# Patient Record
Sex: MALE | Race: WHITE | NOT HISPANIC OR LATINO | Employment: OTHER | URBAN - METROPOLITAN AREA
[De-identification: names, ages, dates, MRNs, and addresses within clinical notes are randomized per-mention and may not be internally consistent; named-entity substitution may affect disease eponyms.]

---

## 2017-03-25 ENCOUNTER — OFFICE VISIT (OUTPATIENT)
Dept: URGENT CARE | Facility: CLINIC | Age: 57
End: 2017-03-25
Payer: OTHER MISCELLANEOUS

## 2017-03-25 VITALS
HEART RATE: 72 BPM | RESPIRATION RATE: 18 BRPM | OXYGEN SATURATION: 97 % | TEMPERATURE: 98.4 F | DIASTOLIC BLOOD PRESSURE: 68 MMHG | SYSTOLIC BLOOD PRESSURE: 104 MMHG
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
## 2017-03-25 DIAGNOSIS — M54.50 ACUTE LEFT-SIDED LOW BACK PAIN WITHOUT SCIATICA: ICD-10-CM

## 2017-03-25 LAB
APPEARANCE UR: CLEAR
BILIRUB UR STRIP-MCNC: NORMAL MG/DL
COLOR UR AUTO: NORMAL
GLUCOSE UR STRIP.AUTO-MCNC: NORMAL MG/DL
KETONES UR STRIP.AUTO-MCNC: NORMAL MG/DL
LEUKOCYTE ESTERASE UR QL STRIP.AUTO: NORMAL
NITRITE UR QL STRIP.AUTO: NORMAL
PH UR STRIP.AUTO: 5 [PH] (ref 5–8)
PROT UR QL STRIP: NORMAL MG/DL
RBC UR QL AUTO: NORMAL
SP GR UR STRIP.AUTO: 1.01
UROBILINOGEN UR STRIP-MCNC: NORMAL MG/DL

## 2017-03-25 PROCEDURE — 99213 OFFICE O/P EST LOW 20 MIN: CPT | Performed by: PHYSICIAN ASSISTANT

## 2017-03-25 PROCEDURE — 81002 URINALYSIS NONAUTO W/O SCOPE: CPT | Performed by: PHYSICIAN ASSISTANT

## 2017-03-25 ASSESSMENT — ENCOUNTER SYMPTOMS
CHILLS: 0
DOUBLE VISION: 0
VOMITING: 0
LEG PAIN: 0
BACK PAIN: 1
FLANK PAIN: 0
ABDOMINAL PAIN: 0
DIARRHEA: 0
BOWEL INCONTINENCE: 0
FALLS: 0
COUGH: 0
BLURRED VISION: 0
SENSORY CHANGE: 0
PERIANAL NUMBNESS: 0
FEVER: 0
NAUSEA: 0
NUMBNESS: 0
HEADACHES: 0
WHEEZING: 0
PALPITATIONS: 0
TINGLING: 0
LOSS OF CONSCIOUSNESS: 0
SHORTNESS OF BREATH: 0
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
## 2017-10-27 ENCOUNTER — APPOINTMENT (OUTPATIENT)
Dept: RADIOLOGY | Facility: IMAGING CENTER | Age: 57
End: 2017-10-27
Attending: FAMILY MEDICINE
Payer: OTHER MISCELLANEOUS

## 2017-10-27 ENCOUNTER — OFFICE VISIT (OUTPATIENT)
Dept: URGENT CARE | Facility: CLINIC | Age: 57
End: 2017-10-27
Payer: OTHER MISCELLANEOUS

## 2017-10-27 VITALS
SYSTOLIC BLOOD PRESSURE: 120 MMHG | WEIGHT: 160 LBS | DIASTOLIC BLOOD PRESSURE: 80 MMHG | RESPIRATION RATE: 20 BRPM | HEIGHT: 73 IN | HEART RATE: 60 BPM | TEMPERATURE: 97.8 F | OXYGEN SATURATION: 99 % | BODY MASS INDEX: 21.2 KG/M2

## 2017-10-27 DIAGNOSIS — R13.19 ESOPHAGEAL DYSPHAGIA: ICD-10-CM

## 2017-10-27 PROCEDURE — 99214 OFFICE O/P EST MOD 30 MIN: CPT | Performed by: FAMILY MEDICINE

## 2017-10-27 PROCEDURE — 71020 DX-CHEST-2 VIEWS: CPT | Mod: TC | Performed by: FAMILY MEDICINE

## 2017-10-27 RX ORDER — HYOSCYAMINE SULFATE 0.125 MG
125 TABLET ORAL EVERY 6 HOURS PRN
Qty: 20 TAB | Refills: 0 | Status: SHIPPED | OUTPATIENT
Start: 2017-10-27 | End: 2019-09-08

## 2017-10-27 ASSESSMENT — ENCOUNTER SYMPTOMS
BLOOD IN STOOL: 0
SHORTNESS OF BREATH: 0
ABDOMINAL PAIN: 0
FEVER: 0
HEMOPTYSIS: 0
FOCAL WEAKNESS: 0
NAUSEA: 0
CHILLS: 0
ORTHOPNEA: 0
DIZZINESS: 0

## 2017-10-27 NOTE — PROGRESS NOTES
"Subjective:      Marcello Kendrick is a 57 y.o. male who presents with Pharyngitis (Couple days a lot mucus)    Chief Complaint   Patient presents with   • Pharyngitis     Couple days a lot mucus        - This is a very pleasant 57 y.o. male with complaints of trouble swallowing solids x 1 day. Sometimes comes back up. Liquids OK. No NVFC, CP/SOB/Abd pain, bloody stools.            ALLERGIES:  Review of patient's allergies indicates no known allergies.     PMH:  No past medical history on file.     MEDS:    Current Outpatient Prescriptions:   •  hyoscyamine (ANASPAZ, LEVSIN) 0.125 MG tablet, Take 1 Tab by mouth every 6 hours as needed for Cramping., Disp: 20 Tab, Rfl: 0  •  tamsulosin (FLOMAX) 0.4 MG capsule, Take 0.4 mg by mouth ONE-HALF HOUR AFTER BREAKFAST., Disp: , Rfl:   •  finasteride (PROSCAR) 5 MG TABS, Take 5 mg by mouth every day., Disp: , Rfl:   •  oxycodone-acetaminophen (PERCOCET) 5-325 MG TABS, Take 1 Tab by mouth every 8 hours as needed for Moderate Pain., Disp: 15 Tab, Rfl: 0  •  ondansetron (ZOFRAN) 8 MG TABS, Take 1 Tab by mouth every 8 hours as needed for Nausea/Vomiting., Disp: 15 Tab, Rfl: 0    ** I have documented what I find to be significant in regards to past medical, social, family and surgical history  in my HPI or under PMH/PSH/FH review section, otherwise it is contributory **           HPI    Review of Systems   Constitutional: Negative for chills and fever.   Respiratory: Negative for hemoptysis and shortness of breath.    Cardiovascular: Negative for chest pain and orthopnea.   Gastrointestinal: Negative for abdominal pain, blood in stool and nausea.   Neurological: Negative for dizziness and focal weakness.          Objective:     /80   Pulse 60   Temp 36.6 °C (97.8 °F)   Resp 20   Ht 1.854 m (6' 1\")   Wt 72.6 kg (160 lb)   SpO2 99%   BMI 21.11 kg/m²      Physical Exam   Constitutional: He appears well-developed. No distress.   HENT:   Head: Normocephalic and atraumatic. "   Mouth/Throat: Oropharynx is clear and moist.   Eyes: Conjunctivae are normal.   Neck: Neck supple.   Cardiovascular: Regular rhythm.    No murmur heard.  Pulmonary/Chest: Effort normal and breath sounds normal. No respiratory distress.   Abdominal: Soft. There is no tenderness.   Neurological: He is alert. He exhibits normal muscle tone.   Skin: Skin is warm and dry.   Psychiatric: He has a normal mood and affect. Judgment normal.   Nursing note and vitals reviewed.              Assessment/Plan:         1. Esophageal dysphagia  DX-CHEST-2 VIEWS    REFERRAL TO FAMILY PRACTICE    REFERRAL TO GASTROENTEROLOGY    hyoscyamine (ANASPAZ, LEVSIN) 0.125 MG tablet     - soft diet  - f/u PCP/GI next week  - worse over weekend go to ER        Dx & d/c instructions discussed w/ patient and/or family members. Follow up w/ Prvt Dr or here in 3-4 days, sooner if needed,  ER if worse and UC/PCP unavailable.        Possible side effects (i.e. Rash, GI upset/constipation, sedation, elevation of BP or sugars) of any medications given discussed.

## 2019-09-08 ENCOUNTER — APPOINTMENT (OUTPATIENT)
Dept: RADIOLOGY | Facility: MEDICAL CENTER | Age: 59
DRG: 470 | End: 2019-09-08
Attending: EMERGENCY MEDICINE
Payer: OTHER MISCELLANEOUS

## 2019-09-08 ENCOUNTER — HOSPITAL ENCOUNTER (INPATIENT)
Facility: MEDICAL CENTER | Age: 59
LOS: 3 days | DRG: 470 | End: 2019-09-11
Attending: EMERGENCY MEDICINE | Admitting: HOSPITALIST
Payer: OTHER MISCELLANEOUS

## 2019-09-08 ENCOUNTER — OFFICE VISIT (OUTPATIENT)
Dept: URGENT CARE | Facility: CLINIC | Age: 59
End: 2019-09-08
Payer: OTHER MISCELLANEOUS

## 2019-09-08 ENCOUNTER — APPOINTMENT (OUTPATIENT)
Dept: RADIOLOGY | Facility: IMAGING CENTER | Age: 59
End: 2019-09-08
Attending: PHYSICIAN ASSISTANT
Payer: OTHER MISCELLANEOUS

## 2019-09-08 VITALS
WEIGHT: 160 LBS | HEIGHT: 73 IN | HEART RATE: 98 BPM | TEMPERATURE: 98.7 F | BODY MASS INDEX: 21.2 KG/M2 | DIASTOLIC BLOOD PRESSURE: 80 MMHG | OXYGEN SATURATION: 99 % | RESPIRATION RATE: 16 BRPM | SYSTOLIC BLOOD PRESSURE: 110 MMHG

## 2019-09-08 DIAGNOSIS — M25.552 LEFT HIP PAIN: ICD-10-CM

## 2019-09-08 DIAGNOSIS — S72.002A CLOSED FRACTURE OF LEFT HIP, INITIAL ENCOUNTER (HCC): ICD-10-CM

## 2019-09-08 PROBLEM — R73.9 HYPERGLYCEMIA: Status: ACTIVE | Noted: 2019-09-08

## 2019-09-08 PROBLEM — D75.89 MACROCYTOSIS: Status: ACTIVE | Noted: 2019-09-08

## 2019-09-08 PROBLEM — N40.0 BPH (BENIGN PROSTATIC HYPERPLASIA): Status: ACTIVE | Noted: 2019-09-08

## 2019-09-08 LAB
ALBUMIN SERPL BCP-MCNC: 3.8 G/DL (ref 3.2–4.9)
ALBUMIN/GLOB SERPL: 1.5 G/DL
ALP SERPL-CCNC: 49 U/L (ref 30–99)
ALT SERPL-CCNC: 13 U/L (ref 2–50)
ANION GAP SERPL CALC-SCNC: 9 MMOL/L (ref 0–11.9)
APTT PPP: 28.1 SEC (ref 24.7–36)
AST SERPL-CCNC: 19 U/L (ref 12–45)
BASOPHILS # BLD AUTO: 0.6 % (ref 0–1.8)
BASOPHILS # BLD: 0.03 K/UL (ref 0–0.12)
BILIRUB SERPL-MCNC: 1.5 MG/DL (ref 0.1–1.5)
BUN SERPL-MCNC: 26 MG/DL (ref 8–22)
CALCIUM SERPL-MCNC: 9.4 MG/DL (ref 8.5–10.5)
CHLORIDE SERPL-SCNC: 105 MMOL/L (ref 96–112)
CO2 SERPL-SCNC: 24 MMOL/L (ref 20–33)
CREAT SERPL-MCNC: 0.87 MG/DL (ref 0.5–1.4)
EOSINOPHIL # BLD AUTO: 0.12 K/UL (ref 0–0.51)
EOSINOPHIL NFR BLD: 2.3 % (ref 0–6.9)
ERYTHROCYTE [DISTWIDTH] IN BLOOD BY AUTOMATED COUNT: 48.4 FL (ref 35.9–50)
GLOBULIN SER CALC-MCNC: 2.5 G/DL (ref 1.9–3.5)
GLUCOSE SERPL-MCNC: 101 MG/DL (ref 65–99)
HCT VFR BLD AUTO: 43.6 % (ref 42–52)
HGB BLD-MCNC: 14.1 G/DL (ref 14–18)
IMM GRANULOCYTES # BLD AUTO: 0.01 K/UL (ref 0–0.11)
IMM GRANULOCYTES NFR BLD AUTO: 0.2 % (ref 0–0.9)
INR PPP: 1.09 (ref 0.87–1.13)
LYMPHOCYTES # BLD AUTO: 0.83 K/UL (ref 1–4.8)
LYMPHOCYTES NFR BLD: 16 % (ref 22–41)
MCH RBC QN AUTO: 33.1 PG (ref 27–33)
MCHC RBC AUTO-ENTMCNC: 32.3 G/DL (ref 33.7–35.3)
MCV RBC AUTO: 102.3 FL (ref 81.4–97.8)
MONOCYTES # BLD AUTO: 0.51 K/UL (ref 0–0.85)
MONOCYTES NFR BLD AUTO: 9.8 % (ref 0–13.4)
NEUTROPHILS # BLD AUTO: 3.69 K/UL (ref 1.82–7.42)
NEUTROPHILS NFR BLD: 71.1 % (ref 44–72)
NRBC # BLD AUTO: 0 K/UL
NRBC BLD-RTO: 0 /100 WBC
PLATELET # BLD AUTO: 111 K/UL (ref 164–446)
PMV BLD AUTO: 12.8 FL (ref 9–12.9)
POTASSIUM SERPL-SCNC: 3.9 MMOL/L (ref 3.6–5.5)
PROT SERPL-MCNC: 6.3 G/DL (ref 6–8.2)
PROTHROMBIN TIME: 14.3 SEC (ref 12–14.6)
RBC # BLD AUTO: 4.26 M/UL (ref 4.7–6.1)
SODIUM SERPL-SCNC: 138 MMOL/L (ref 135–145)
WBC # BLD AUTO: 5.2 K/UL (ref 4.8–10.8)

## 2019-09-08 PROCEDURE — 73501 X-RAY EXAM HIP UNI 1 VIEW: CPT | Mod: TC,LT | Performed by: PHYSICIAN ASSISTANT

## 2019-09-08 PROCEDURE — 85730 THROMBOPLASTIN TIME PARTIAL: CPT

## 2019-09-08 PROCEDURE — 700105 HCHG RX REV CODE 258: Performed by: HOSPITALIST

## 2019-09-08 PROCEDURE — 99214 OFFICE O/P EST MOD 30 MIN: CPT | Performed by: PHYSICIAN ASSISTANT

## 2019-09-08 PROCEDURE — 85025 COMPLETE CBC W/AUTO DIFF WBC: CPT

## 2019-09-08 PROCEDURE — 93005 ELECTROCARDIOGRAM TRACING: CPT | Performed by: EMERGENCY MEDICINE

## 2019-09-08 PROCEDURE — 80053 COMPREHEN METABOLIC PANEL: CPT

## 2019-09-08 PROCEDURE — 99223 1ST HOSP IP/OBS HIGH 75: CPT | Performed by: HOSPITALIST

## 2019-09-08 PROCEDURE — 85610 PROTHROMBIN TIME: CPT

## 2019-09-08 PROCEDURE — 770006 HCHG ROOM/CARE - MED/SURG/GYN SEMI*

## 2019-09-08 PROCEDURE — 71045 X-RAY EXAM CHEST 1 VIEW: CPT

## 2019-09-08 PROCEDURE — 73552 X-RAY EXAM OF FEMUR 2/>: CPT | Mod: LT

## 2019-09-08 RX ORDER — DOXAZOSIN MESYLATE 1 MG/1
1 TABLET ORAL
COMMUNITY

## 2019-09-08 RX ORDER — PROCHLORPERAZINE EDISYLATE 5 MG/ML
5-10 INJECTION INTRAMUSCULAR; INTRAVENOUS EVERY 4 HOURS PRN
Status: DISCONTINUED | OUTPATIENT
Start: 2019-09-08 | End: 2019-09-11 | Stop reason: HOSPADM

## 2019-09-08 RX ORDER — ONDANSETRON 2 MG/ML
4 INJECTION INTRAMUSCULAR; INTRAVENOUS EVERY 4 HOURS PRN
Status: DISCONTINUED | OUTPATIENT
Start: 2019-09-08 | End: 2019-09-11 | Stop reason: HOSPADM

## 2019-09-08 RX ORDER — BISACODYL 10 MG
10 SUPPOSITORY, RECTAL RECTAL
Status: DISCONTINUED | OUTPATIENT
Start: 2019-09-08 | End: 2019-09-11 | Stop reason: HOSPADM

## 2019-09-08 RX ORDER — POLYETHYLENE GLYCOL 3350 17 G/17G
1 POWDER, FOR SOLUTION ORAL
Status: DISCONTINUED | OUTPATIENT
Start: 2019-09-08 | End: 2019-09-11 | Stop reason: HOSPADM

## 2019-09-08 RX ORDER — ONDANSETRON 4 MG/1
4 TABLET, ORALLY DISINTEGRATING ORAL EVERY 4 HOURS PRN
Status: DISCONTINUED | OUTPATIENT
Start: 2019-09-08 | End: 2019-09-11 | Stop reason: HOSPADM

## 2019-09-08 RX ORDER — PROMETHAZINE HYDROCHLORIDE 12.5 MG/1
12.5-25 SUPPOSITORY RECTAL EVERY 4 HOURS PRN
Status: DISCONTINUED | OUTPATIENT
Start: 2019-09-08 | End: 2019-09-11 | Stop reason: HOSPADM

## 2019-09-08 RX ORDER — PROMETHAZINE HYDROCHLORIDE 25 MG/1
12.5-25 TABLET ORAL EVERY 4 HOURS PRN
Status: DISCONTINUED | OUTPATIENT
Start: 2019-09-08 | End: 2019-09-11 | Stop reason: HOSPADM

## 2019-09-08 RX ORDER — SODIUM CHLORIDE 9 MG/ML
INJECTION, SOLUTION INTRAVENOUS CONTINUOUS
Status: DISCONTINUED | OUTPATIENT
Start: 2019-09-08 | End: 2019-09-10

## 2019-09-08 RX ORDER — AMOXICILLIN 250 MG
2 CAPSULE ORAL 2 TIMES DAILY
Status: DISCONTINUED | OUTPATIENT
Start: 2019-09-09 | End: 2019-09-11 | Stop reason: HOSPADM

## 2019-09-08 RX ORDER — DOXAZOSIN 2 MG/1
1 TABLET ORAL
Status: DISCONTINUED | OUTPATIENT
Start: 2019-09-09 | End: 2019-09-11 | Stop reason: HOSPADM

## 2019-09-08 RX ADMIN — SODIUM CHLORIDE: 9 INJECTION, SOLUTION INTRAVENOUS at 23:52

## 2019-09-08 ASSESSMENT — PATIENT HEALTH QUESTIONNAIRE - PHQ9
1. LITTLE INTEREST OR PLEASURE IN DOING THINGS: NOT AT ALL
SUM OF ALL RESPONSES TO PHQ9 QUESTIONS 1 AND 2: 0
2. FEELING DOWN, DEPRESSED, IRRITABLE, OR HOPELESS: NOT AT ALL

## 2019-09-08 ASSESSMENT — LIFESTYLE VARIABLES
CONSUMPTION TOTAL: NEGATIVE
TOTAL SCORE: 0
EVER FELT BAD OR GUILTY ABOUT YOUR DRINKING: NO
ON A TYPICAL DAY WHEN YOU DRINK ALCOHOL HOW MANY DRINKS DO YOU HAVE: 1
AVERAGE NUMBER OF DAYS PER WEEK YOU HAVE A DRINK CONTAINING ALCOHOL: 0
HAVE YOU EVER FELT YOU SHOULD CUT DOWN ON YOUR DRINKING: NO
TOTAL SCORE: 0
EVER HAD A DRINK FIRST THING IN THE MORNING TO STEADY YOUR NERVES TO GET RID OF A HANGOVER: NO
HOW MANY TIMES IN THE PAST YEAR HAVE YOU HAD 5 OR MORE DRINKS IN A DAY: 0
ALCOHOL_USE: YES
TOTAL SCORE: 0
HAVE PEOPLE ANNOYED YOU BY CRITICIZING YOUR DRINKING: NO

## 2019-09-08 ASSESSMENT — COGNITIVE AND FUNCTIONAL STATUS - GENERAL
CLIMB 3 TO 5 STEPS WITH RAILING: A LITTLE
MOBILITY SCORE: 20
WALKING IN HOSPITAL ROOM: A LITTLE
TOILETING: A LITTLE
DAILY ACTIVITIY SCORE: 21
DRESSING REGULAR LOWER BODY CLOTHING: A LITTLE
SUGGESTED CMS G CODE MODIFIER DAILY ACTIVITY: CJ
HELP NEEDED FOR BATHING: A LITTLE
MOVING FROM LYING ON BACK TO SITTING ON SIDE OF FLAT BED: A LITTLE
STANDING UP FROM CHAIR USING ARMS: A LITTLE
SUGGESTED CMS G CODE MODIFIER MOBILITY: CJ

## 2019-09-08 NOTE — ED PROVIDER NOTES
ED Provider Note    CHIEF COMPLAINT  Chief Complaint   Patient presents with   • Hip Injury     fell walking down hill on Thursday, landed on rock   • Sent from Urgent Care     found to have femoral neck fx with impaction       HPI  Marcello Kendrick is a 58 y.o. male who presents to the emergency department complaining of left hip pain.  On Thursday now 3 days ago the patient was hiking down a hill he slipped and fell landing on a rock striking his left hip and since that time is had a lot of pain in the area of the left hip.  He has been trying to bear through the pain and has been using crutches which he had at home he is been taking Advil without much relief and because of continued symptoms today went to an urgent care where he had an x-ray showing a femoral neck fracture and the patient was directed here for further care.  His last oral intake was 9:30 AM this morning.  He feels that he is otherwise uninjured    REVIEW OF SYSTEMS he did not strike his head, no neck or spine injury no other extremity injury or injury to the torso.  All other systems negative    PAST MEDICAL HISTORY  History reviewed. No pertinent past medical history.    FAMILY HISTORY  History reviewed. No pertinent family history.    SOCIAL HISTORY  Social History     Socioeconomic History   • Marital status:      Spouse name: Not on file   • Number of children: Not on file   • Years of education: Not on file   • Highest education level: Not on file   Occupational History   • Not on file   Social Needs   • Financial resource strain: Not on file   • Food insecurity:     Worry: Not on file     Inability: Not on file   • Transportation needs:     Medical: Not on file     Non-medical: Not on file   Tobacco Use   • Smoking status: Never Smoker   • Smokeless tobacco: Never Used   Substance and Sexual Activity   • Alcohol use: Yes     Comment: occ   • Drug use: Never   • Sexual activity: Not on file   Lifestyle   • Physical activity:     Days  "per week: Not on file     Minutes per session: Not on file   • Stress: Not on file   Relationships   • Social connections:     Talks on phone: Not on file     Gets together: Not on file     Attends Mandaen service: Not on file     Active member of club or organization: Not on file     Attends meetings of clubs or organizations: Not on file     Relationship status: Not on file   • Intimate partner violence:     Fear of current or ex partner: Not on file     Emotionally abused: Not on file     Physically abused: Not on file     Forced sexual activity: Not on file   Other Topics Concern   • Not on file   Social History Narrative   • Not on file       SURGICAL HISTORY  Past Surgical History:   Procedure Laterality Date   • RETINAL DETACHMENT REPAIR         CURRENT MEDICATIONS  Home Medications     Reviewed by Darien Smith (Pharmacy Tech) on 09/08/19 at 1452  Med List Status: Complete   Medication Last Dose Status   doxazosin (CARDURA) 1 MG Tab 9/7/2019 Active                ALLERGIES  No Known Allergies    PHYSICAL EXAM  VITAL SIGNS: /75   Pulse 84   Temp 37 °C (98.6 °F) (Temporal)   Resp 16   Ht 1.854 m (6' 1\")   Wt 72.6 kg (160 lb)   SpO2 98%   BMI 21.11 kg/m²    Oxygen saturation is interpreted as adequate  Constitutional: Awake and well appearing individual  HENT: No sign of trauma to the head  Eyes: Pupils round extraocular motion present  Neck: C-spine nontender  Cardiovascular: Regular rate and rhythm  Lungs: Clear and equal bilaterally  Abdomen/Back: No thoracic or lumbar tenderness  Skin: Dry  Musculoskeletal: No acute bony deformity although the patient has pain around the left hip.  The distal part of the leg looks normal and warm and well-perfused   neurologic: Awake verbal moving his other extremities without difficulty    CHART REVIEW  I reviewed the x-ray results from the urgent care that showed an impacted left femoral neck fracture    Laboratory  CBC shows white blood cell count of " 5.2 hemoglobin is adequate at 14.1 complete metabolic panel is unremarkable INR is 1.09    EKG interpretation  Twelve-lead preop EKG shows sinus rhythm 62 bpm there is no pathologic ST elevation or ectopy the VT interval is 196 ms QTc interval is 419 ms    Radiology  DX-CHEST-LIMITED (1 VIEW)   Final Result      No evidence of acute cardiopulmonary process.      DX-FEMUR-2+ LEFT   Final Result      Impacted and angulated left femoral neck fracture.        MEDICAL DECISION MAKING and DISPOSITION  In the emergency department an IV was established the patient was initially kept n.p.o.  I reviewed all the findings with the patient and his wife and I reviewed the case with Dr. Mckeon who is come to the emergency department and has seen the patient and the patient will be admitted for hip surgery tomorrow by Dr. Boyce.  I reviewed the case with the hospitalist and the patient is admitted to the hospitalist service    IMPRESSION  1.  Left femoral neck fracture      Electronically signed by: Jose L Ortega, 9/8/2019 4:38 PM

## 2019-09-08 NOTE — ED TRIAGE NOTES
Marcello Kendrick  58 y.o.  Chief Complaint   Patient presents with   • Hip Injury     fell walking down hill on Thursday, landed on rock   • Sent from Urgent Care     found to have femoral neck fx with impaction     Patient wheeled in wc with wife to triage room with above complaint.  Patient appears in mild discomfort.  States pain is mostly there when he tries to bare weight.    Charge notified of patient.  Patient escorted to room 66.

## 2019-09-09 ENCOUNTER — ANESTHESIA (OUTPATIENT)
Dept: SURGERY | Facility: MEDICAL CENTER | Age: 59
DRG: 470 | End: 2019-09-09
Payer: OTHER MISCELLANEOUS

## 2019-09-09 ENCOUNTER — APPOINTMENT (OUTPATIENT)
Dept: RADIOLOGY | Facility: MEDICAL CENTER | Age: 59
DRG: 470 | End: 2019-09-09
Attending: ORTHOPAEDIC SURGERY
Payer: OTHER MISCELLANEOUS

## 2019-09-09 PROBLEM — D69.6 THROMBOCYTOPENIA (HCC): Status: ACTIVE | Noted: 2019-09-09

## 2019-09-09 LAB
ABO + RH BLD: NORMAL
ABO GROUP BLD: NORMAL
ANION GAP SERPL CALC-SCNC: 9 MMOL/L (ref 0–11.9)
BASOPHILS # BLD AUTO: 0.5 % (ref 0–1.8)
BASOPHILS # BLD: 0.03 K/UL (ref 0–0.12)
BLD GP AB SCN SERPL QL: NORMAL
BUN SERPL-MCNC: 24 MG/DL (ref 8–22)
CALCIUM SERPL-MCNC: 9.2 MG/DL (ref 8.5–10.5)
CHLORIDE SERPL-SCNC: 105 MMOL/L (ref 96–112)
CO2 SERPL-SCNC: 24 MMOL/L (ref 20–33)
CREAT SERPL-MCNC: 0.8 MG/DL (ref 0.5–1.4)
EOSINOPHIL # BLD AUTO: 0.16 K/UL (ref 0–0.51)
EOSINOPHIL NFR BLD: 2.8 % (ref 0–6.9)
ERYTHROCYTE [DISTWIDTH] IN BLOOD BY AUTOMATED COUNT: 47.8 FL (ref 35.9–50)
FOLATE SERPL-MCNC: 12.6 NG/ML
GLUCOSE SERPL-MCNC: 97 MG/DL (ref 65–99)
HCT VFR BLD AUTO: 33.1 % (ref 42–52)
HCT VFR BLD AUTO: 43.7 % (ref 42–52)
HGB BLD-MCNC: 10.9 G/DL (ref 14–18)
HGB BLD-MCNC: 14.6 G/DL (ref 14–18)
IMM GRANULOCYTES # BLD AUTO: 0.02 K/UL (ref 0–0.11)
IMM GRANULOCYTES NFR BLD AUTO: 0.4 % (ref 0–0.9)
LYMPHOCYTES # BLD AUTO: 1.21 K/UL (ref 1–4.8)
LYMPHOCYTES NFR BLD: 21.4 % (ref 22–41)
MAGNESIUM SERPL-MCNC: 1.8 MG/DL (ref 1.5–2.5)
MCH RBC QN AUTO: 33.9 PG (ref 27–33)
MCHC RBC AUTO-ENTMCNC: 33.4 G/DL (ref 33.7–35.3)
MCV RBC AUTO: 101.4 FL (ref 81.4–97.8)
MONOCYTES # BLD AUTO: 0.52 K/UL (ref 0–0.85)
MONOCYTES NFR BLD AUTO: 9.2 % (ref 0–13.4)
NEUTROPHILS # BLD AUTO: 3.72 K/UL (ref 1.82–7.42)
NEUTROPHILS NFR BLD: 65.7 % (ref 44–72)
NRBC # BLD AUTO: 0 K/UL
NRBC BLD-RTO: 0 /100 WBC
PLATELET # BLD AUTO: 117 K/UL (ref 164–446)
PMV BLD AUTO: 12.5 FL (ref 9–12.9)
POTASSIUM SERPL-SCNC: 3.6 MMOL/L (ref 3.6–5.5)
RBC # BLD AUTO: 4.31 M/UL (ref 4.7–6.1)
RH BLD: NORMAL
SODIUM SERPL-SCNC: 138 MMOL/L (ref 135–145)
VIT B12 SERPL-MCNC: 639 PG/ML (ref 211–911)
WBC # BLD AUTO: 5.7 K/UL (ref 4.8–10.8)

## 2019-09-09 PROCEDURE — 700111 HCHG RX REV CODE 636 W/ 250 OVERRIDE (IP): Performed by: ORTHOPAEDIC SURGERY

## 2019-09-09 PROCEDURE — 82746 ASSAY OF FOLIC ACID SERUM: CPT

## 2019-09-09 PROCEDURE — 700111 HCHG RX REV CODE 636 W/ 250 OVERRIDE (IP): Performed by: ANESTHESIOLOGY

## 2019-09-09 PROCEDURE — 86901 BLOOD TYPING SEROLOGIC RH(D): CPT

## 2019-09-09 PROCEDURE — 99233 SBSQ HOSP IP/OBS HIGH 50: CPT | Performed by: FAMILY MEDICINE

## 2019-09-09 PROCEDURE — 72170 X-RAY EXAM OF PELVIS: CPT

## 2019-09-09 PROCEDURE — 80048 BASIC METABOLIC PNL TOTAL CA: CPT

## 2019-09-09 PROCEDURE — 0SRB04A REPLACEMENT OF LEFT HIP JOINT WITH CERAMIC ON POLYETHYLENE SYNTHETIC SUBSTITUTE, UNCEMENTED, OPEN APPROACH: ICD-10-PCS | Performed by: ORTHOPAEDIC SURGERY

## 2019-09-09 PROCEDURE — 160031 HCHG SURGERY MINUTES - 1ST 30 MINS LEVEL 5: Performed by: ORTHOPAEDIC SURGERY

## 2019-09-09 PROCEDURE — 86900 BLOOD TYPING SEROLOGIC ABO: CPT

## 2019-09-09 PROCEDURE — 501838 HCHG SUTURE GENERAL: Performed by: ORTHOPAEDIC SURGERY

## 2019-09-09 PROCEDURE — 83735 ASSAY OF MAGNESIUM: CPT

## 2019-09-09 PROCEDURE — 86850 RBC ANTIBODY SCREEN: CPT

## 2019-09-09 PROCEDURE — A9270 NON-COVERED ITEM OR SERVICE: HCPCS | Performed by: HOSPITALIST

## 2019-09-09 PROCEDURE — 160035 HCHG PACU - 1ST 60 MINS PHASE I: Performed by: ORTHOPAEDIC SURGERY

## 2019-09-09 PROCEDURE — 85018 HEMOGLOBIN: CPT

## 2019-09-09 PROCEDURE — A9270 NON-COVERED ITEM OR SERVICE: HCPCS | Performed by: ANESTHESIOLOGY

## 2019-09-09 PROCEDURE — 501445 HCHG STAPLER, SKIN DISP: Performed by: ORTHOPAEDIC SURGERY

## 2019-09-09 PROCEDURE — 160002 HCHG RECOVERY MINUTES (STAT): Performed by: ORTHOPAEDIC SURGERY

## 2019-09-09 PROCEDURE — 0QS704Z REPOSITION LEFT UPPER FEMUR WITH INTERNAL FIXATION DEVICE, OPEN APPROACH: ICD-10-PCS | Performed by: ORTHOPAEDIC SURGERY

## 2019-09-09 PROCEDURE — 502000 HCHG MISC OR IMPLANTS RC 0278: Performed by: ORTHOPAEDIC SURGERY

## 2019-09-09 PROCEDURE — 85025 COMPLETE CBC W/AUTO DIFF WBC: CPT

## 2019-09-09 PROCEDURE — 700102 HCHG RX REV CODE 250 W/ 637 OVERRIDE(OP): Performed by: ANESTHESIOLOGY

## 2019-09-09 PROCEDURE — 82607 VITAMIN B-12: CPT

## 2019-09-09 PROCEDURE — 700105 HCHG RX REV CODE 258: Performed by: ANESTHESIOLOGY

## 2019-09-09 PROCEDURE — 160042 HCHG SURGERY MINUTES - EA ADDL 1 MIN LEVEL 5: Performed by: ORTHOPAEDIC SURGERY

## 2019-09-09 PROCEDURE — 160036 HCHG PACU - EA ADDL 30 MINS PHASE I: Performed by: ORTHOPAEDIC SURGERY

## 2019-09-09 PROCEDURE — 160048 HCHG OR STATISTICAL LEVEL 1-5: Performed by: ORTHOPAEDIC SURGERY

## 2019-09-09 PROCEDURE — 502578 HCHG PACK, TOTAL HIP: Performed by: ORTHOPAEDIC SURGERY

## 2019-09-09 PROCEDURE — 700105 HCHG RX REV CODE 258: Performed by: ORTHOPAEDIC SURGERY

## 2019-09-09 PROCEDURE — 770001 HCHG ROOM/CARE - MED/SURG/GYN PRIV*

## 2019-09-09 PROCEDURE — 36415 COLL VENOUS BLD VENIPUNCTURE: CPT

## 2019-09-09 PROCEDURE — 85014 HEMATOCRIT: CPT

## 2019-09-09 PROCEDURE — 700101 HCHG RX REV CODE 250: Performed by: ORTHOPAEDIC SURGERY

## 2019-09-09 PROCEDURE — 160009 HCHG ANES TIME/MIN: Performed by: ORTHOPAEDIC SURGERY

## 2019-09-09 PROCEDURE — 700101 HCHG RX REV CODE 250: Performed by: ANESTHESIOLOGY

## 2019-09-09 PROCEDURE — 700102 HCHG RX REV CODE 250 W/ 637 OVERRIDE(OP): Performed by: HOSPITALIST

## 2019-09-09 DEVICE — IMPLANTABLE DEVICE: Type: IMPLANTABLE DEVICE | Site: HIP | Status: FUNCTIONAL

## 2019-09-09 RX ORDER — LIDOCAINE HYDROCHLORIDE 40 MG/ML
SOLUTION TOPICAL PRN
Status: DISCONTINUED | OUTPATIENT
Start: 2019-09-09 | End: 2019-09-09 | Stop reason: SURG

## 2019-09-09 RX ORDER — CEFAZOLIN SODIUM 2 G/100ML
2 INJECTION, SOLUTION INTRAVENOUS EVERY 8 HOURS
Status: COMPLETED | OUTPATIENT
Start: 2019-09-09 | End: 2019-09-10

## 2019-09-09 RX ORDER — HYDROMORPHONE HYDROCHLORIDE 1 MG/ML
0.4 INJECTION, SOLUTION INTRAMUSCULAR; INTRAVENOUS; SUBCUTANEOUS
Status: DISCONTINUED | OUTPATIENT
Start: 2019-09-09 | End: 2019-09-09 | Stop reason: HOSPADM

## 2019-09-09 RX ORDER — ONDANSETRON 2 MG/ML
4 INJECTION INTRAMUSCULAR; INTRAVENOUS
Status: DISCONTINUED | OUTPATIENT
Start: 2019-09-09 | End: 2019-09-09 | Stop reason: HOSPADM

## 2019-09-09 RX ORDER — SODIUM CHLORIDE, SODIUM LACTATE, POTASSIUM CHLORIDE, CALCIUM CHLORIDE 600; 310; 30; 20 MG/100ML; MG/100ML; MG/100ML; MG/100ML
INJECTION, SOLUTION INTRAVENOUS CONTINUOUS
Status: DISCONTINUED | OUTPATIENT
Start: 2019-09-09 | End: 2019-09-09 | Stop reason: HOSPADM

## 2019-09-09 RX ORDER — GABAPENTIN 300 MG/1
300 CAPSULE ORAL ONCE
Status: COMPLETED | OUTPATIENT
Start: 2019-09-09 | End: 2019-09-09

## 2019-09-09 RX ORDER — TRANEXAMIC ACID 100 MG/ML
INJECTION, SOLUTION INTRAVENOUS PRN
Status: DISCONTINUED | OUTPATIENT
Start: 2019-09-09 | End: 2019-09-09 | Stop reason: HOSPADM

## 2019-09-09 RX ORDER — MIDAZOLAM HYDROCHLORIDE 1 MG/ML
INJECTION INTRAMUSCULAR; INTRAVENOUS PRN
Status: DISCONTINUED | OUTPATIENT
Start: 2019-09-09 | End: 2019-09-09 | Stop reason: SURG

## 2019-09-09 RX ORDER — CEFAZOLIN SODIUM 1 G/3ML
INJECTION, POWDER, FOR SOLUTION INTRAMUSCULAR; INTRAVENOUS PRN
Status: DISCONTINUED | OUTPATIENT
Start: 2019-09-09 | End: 2019-09-09 | Stop reason: SURG

## 2019-09-09 RX ORDER — HYDROMORPHONE HYDROCHLORIDE 1 MG/ML
0.1 INJECTION, SOLUTION INTRAMUSCULAR; INTRAVENOUS; SUBCUTANEOUS
Status: DISCONTINUED | OUTPATIENT
Start: 2019-09-09 | End: 2019-09-09 | Stop reason: HOSPADM

## 2019-09-09 RX ORDER — PHENYLEPHRINE HCL IN 0.9% NACL 0.5 MG/5ML
SYRINGE (ML) INTRAVENOUS PRN
Status: DISCONTINUED | OUTPATIENT
Start: 2019-09-09 | End: 2019-09-09 | Stop reason: SURG

## 2019-09-09 RX ORDER — DEXAMETHASONE SODIUM PHOSPHATE 4 MG/ML
INJECTION, SOLUTION INTRA-ARTICULAR; INTRALESIONAL; INTRAMUSCULAR; INTRAVENOUS; SOFT TISSUE PRN
Status: DISCONTINUED | OUTPATIENT
Start: 2019-09-09 | End: 2019-09-09 | Stop reason: SURG

## 2019-09-09 RX ORDER — HYDROMORPHONE HYDROCHLORIDE 2 MG/ML
INJECTION, SOLUTION INTRAMUSCULAR; INTRAVENOUS; SUBCUTANEOUS PRN
Status: DISCONTINUED | OUTPATIENT
Start: 2019-09-09 | End: 2019-09-09 | Stop reason: SURG

## 2019-09-09 RX ORDER — SODIUM CHLORIDE, SODIUM LACTATE, POTASSIUM CHLORIDE, CALCIUM CHLORIDE 600; 310; 30; 20 MG/100ML; MG/100ML; MG/100ML; MG/100ML
INJECTION, SOLUTION INTRAVENOUS
Status: DISCONTINUED | OUTPATIENT
Start: 2019-09-09 | End: 2019-09-09 | Stop reason: SURG

## 2019-09-09 RX ORDER — CELECOXIB 200 MG/1
200 CAPSULE ORAL ONCE
Status: COMPLETED | OUTPATIENT
Start: 2019-09-09 | End: 2019-09-09

## 2019-09-09 RX ORDER — ROCURONIUM BROMIDE 10 MG/ML
INJECTION, SOLUTION INTRAVENOUS PRN
Status: DISCONTINUED | OUTPATIENT
Start: 2019-09-09 | End: 2019-09-09 | Stop reason: SURG

## 2019-09-09 RX ORDER — DIPHENHYDRAMINE HYDROCHLORIDE 50 MG/ML
12.5 INJECTION INTRAMUSCULAR; INTRAVENOUS
Status: DISCONTINUED | OUTPATIENT
Start: 2019-09-09 | End: 2019-09-09 | Stop reason: HOSPADM

## 2019-09-09 RX ORDER — ONDANSETRON 2 MG/ML
INJECTION INTRAMUSCULAR; INTRAVENOUS PRN
Status: DISCONTINUED | OUTPATIENT
Start: 2019-09-09 | End: 2019-09-09 | Stop reason: SURG

## 2019-09-09 RX ORDER — BUPIVACAINE HYDROCHLORIDE AND EPINEPHRINE 5; 5 MG/ML; UG/ML
INJECTION, SOLUTION EPIDURAL; INTRACAUDAL; PERINEURAL
Status: DISCONTINUED | OUTPATIENT
Start: 2019-09-09 | End: 2019-09-09 | Stop reason: HOSPADM

## 2019-09-09 RX ORDER — MEPERIDINE HYDROCHLORIDE 25 MG/ML
6.25 INJECTION INTRAMUSCULAR; INTRAVENOUS; SUBCUTANEOUS
Status: DISCONTINUED | OUTPATIENT
Start: 2019-09-09 | End: 2019-09-09 | Stop reason: HOSPADM

## 2019-09-09 RX ORDER — OXYCODONE HCL 5 MG/5 ML
5 SOLUTION, ORAL ORAL
Status: DISCONTINUED | OUTPATIENT
Start: 2019-09-09 | End: 2019-09-09 | Stop reason: HOSPADM

## 2019-09-09 RX ORDER — HALOPERIDOL 5 MG/ML
1 INJECTION INTRAMUSCULAR
Status: DISCONTINUED | OUTPATIENT
Start: 2019-09-09 | End: 2019-09-09 | Stop reason: HOSPADM

## 2019-09-09 RX ORDER — OXYCODONE HCL 5 MG/5 ML
10 SOLUTION, ORAL ORAL
Status: DISCONTINUED | OUTPATIENT
Start: 2019-09-09 | End: 2019-09-09 | Stop reason: HOSPADM

## 2019-09-09 RX ORDER — SODIUM CHLORIDE, SODIUM GLUCONATE, SODIUM ACETATE, POTASSIUM CHLORIDE AND MAGNESIUM CHLORIDE 526; 502; 368; 37; 30 MG/100ML; MG/100ML; MG/100ML; MG/100ML; MG/100ML
INJECTION, SOLUTION INTRAVENOUS
Status: DISCONTINUED | OUTPATIENT
Start: 2019-09-09 | End: 2019-09-09 | Stop reason: SURG

## 2019-09-09 RX ORDER — ACETAMINOPHEN 500 MG
1000 TABLET ORAL ONCE
Status: COMPLETED | OUTPATIENT
Start: 2019-09-09 | End: 2019-09-09

## 2019-09-09 RX ORDER — HYDROMORPHONE HYDROCHLORIDE 1 MG/ML
0.2 INJECTION, SOLUTION INTRAMUSCULAR; INTRAVENOUS; SUBCUTANEOUS
Status: DISCONTINUED | OUTPATIENT
Start: 2019-09-09 | End: 2019-09-09 | Stop reason: HOSPADM

## 2019-09-09 RX ADMIN — SODIUM CHLORIDE, SODIUM GLUCONATE, SODIUM ACETATE, POTASSIUM CHLORIDE AND MAGNESIUM CHLORIDE: 526; 502; 368; 37; 30 INJECTION, SOLUTION INTRAVENOUS at 11:40

## 2019-09-09 RX ADMIN — DOXAZOSIN 1 MG: 2 TABLET ORAL at 20:47

## 2019-09-09 RX ADMIN — Medication 100 MCG: at 10:23

## 2019-09-09 RX ADMIN — CEFAZOLIN 2 G: 330 INJECTION, POWDER, FOR SOLUTION INTRAMUSCULAR; INTRAVENOUS at 10:22

## 2019-09-09 RX ADMIN — HYDROMORPHONE HYDROCHLORIDE 0.5 MG: 2 INJECTION, SOLUTION INTRAMUSCULAR; INTRAVENOUS; SUBCUTANEOUS at 10:38

## 2019-09-09 RX ADMIN — LIDOCAINE HYDROCHLORIDE 4 ML: 40 SOLUTION TOPICAL at 10:14

## 2019-09-09 RX ADMIN — VANCOMYCIN HYDROCHLORIDE 1100 MG: 1 INJECTION, POWDER, LYOPHILIZED, FOR SOLUTION INTRAVENOUS at 10:20

## 2019-09-09 RX ADMIN — EPHEDRINE SULFATE 10 MG: 50 INJECTION, SOLUTION INTRAVENOUS at 10:31

## 2019-09-09 RX ADMIN — MIDAZOLAM 2 MG: 1 INJECTION INTRAMUSCULAR; INTRAVENOUS at 10:08

## 2019-09-09 RX ADMIN — TRANEXAMIC ACID 1000 MG: 100 INJECTION, SOLUTION INTRAVENOUS at 10:25

## 2019-09-09 RX ADMIN — FENTANYL CITRATE 100 MCG: 50 INJECTION, SOLUTION INTRAMUSCULAR; INTRAVENOUS at 10:12

## 2019-09-09 RX ADMIN — ACETAMINOPHEN 1000 MG: 500 TABLET ORAL at 08:52

## 2019-09-09 RX ADMIN — CELECOXIB 200 MG: 200 CAPSULE ORAL at 08:52

## 2019-09-09 RX ADMIN — VANCOMYCIN HYDROCHLORIDE 1100 MG: 500 INJECTION, POWDER, LYOPHILIZED, FOR SOLUTION INTRAVENOUS at 20:47

## 2019-09-09 RX ADMIN — PROPOFOL 200 MG: 10 INJECTION, EMULSION INTRAVENOUS at 10:13

## 2019-09-09 RX ADMIN — DEXAMETHASONE SODIUM PHOSPHATE 8 MG: 4 INJECTION, SOLUTION INTRA-ARTICULAR; INTRALESIONAL; INTRAMUSCULAR; INTRAVENOUS; SOFT TISSUE at 10:39

## 2019-09-09 RX ADMIN — CEFAZOLIN SODIUM 2 G: 2 INJECTION, SOLUTION INTRAVENOUS at 17:56

## 2019-09-09 RX ADMIN — SODIUM CHLORIDE, POTASSIUM CHLORIDE, SODIUM LACTATE AND CALCIUM CHLORIDE: 600; 310; 30; 20 INJECTION, SOLUTION INTRAVENOUS at 12:09

## 2019-09-09 RX ADMIN — ONDANSETRON 4 MG: 2 INJECTION INTRAMUSCULAR; INTRAVENOUS at 11:49

## 2019-09-09 RX ADMIN — SODIUM CHLORIDE, POTASSIUM CHLORIDE, SODIUM LACTATE AND CALCIUM CHLORIDE: 600; 310; 30; 20 INJECTION, SOLUTION INTRAVENOUS at 14:01

## 2019-09-09 RX ADMIN — Medication 100 MCG: at 11:06

## 2019-09-09 RX ADMIN — TRANEXAMIC ACID 1000 MG: 100 INJECTION, SOLUTION INTRAVENOUS at 11:30

## 2019-09-09 RX ADMIN — SODIUM CHLORIDE, SODIUM GLUCONATE, SODIUM ACETATE, POTASSIUM CHLORIDE AND MAGNESIUM CHLORIDE: 526; 502; 368; 37; 30 INJECTION, SOLUTION INTRAVENOUS at 11:55

## 2019-09-09 RX ADMIN — SODIUM CHLORIDE, POTASSIUM CHLORIDE, SODIUM LACTATE AND CALCIUM CHLORIDE: 600; 310; 30; 20 INJECTION, SOLUTION INTRAVENOUS at 10:04

## 2019-09-09 RX ADMIN — ROCURONIUM BROMIDE 50 MG: 10 INJECTION, SOLUTION INTRAVENOUS at 10:13

## 2019-09-09 RX ADMIN — GABAPENTIN 300 MG: 300 CAPSULE ORAL at 08:52

## 2019-09-09 ASSESSMENT — ENCOUNTER SYMPTOMS
DIZZINESS: 0
MYALGIAS: 0
DEPRESSION: 0
ORTHOPNEA: 0
HEARTBURN: 0
BACK PAIN: 0
NECK PAIN: 0
HEADACHES: 0
FEVER: 0
DOUBLE VISION: 0
CHILLS: 0
BLURRED VISION: 0
NAUSEA: 0
PHOTOPHOBIA: 0
VOMITING: 0
HEMOPTYSIS: 0
PALPITATIONS: 0
TINGLING: 0
SPUTUM PRODUCTION: 0
COUGH: 0

## 2019-09-09 ASSESSMENT — LIFESTYLE VARIABLES: SUBSTANCE_ABUSE: 0

## 2019-09-09 NOTE — CONSULTS
Orthopaedic Surgery Consult Note:    Oli Arevalo  Date & Time note created:    9/9/2019   12:51 AM       Patient ID:   Name:             Marcello Kendrick     YOB: 1960  Age:                 58 y.o.  male   MRN:               [id number]                                                             Reason for Consult:      Left hip fracture    History of Present Illness:    Mr. Kendrick is a pleasant gentleman who fell 3 days ago while out walking his dogs.  He has been unable to bear weight on the LLE since that time.  He denies striking his head or LOC.  He denies numbness or tingling in the LLE.  His pain localizes to the left groin and is worse with movement of the LLE.  He does report a hx of mild left groin pain.    Review of Systems:      Constitutional: Denies fevers, Denies weight changes  Eyes: Denies changes in vision, no eye pain  Ears/Nose/Throat/Mouth: Denies nasal congestion or sore throat   Cardiovascular: Denies chest pain   Respiratory: Denies shortness of breath , Denies cough  Gastrointestinal/Hepatic: Denies abdominal pain, nausea, vomiting, diarrhea, constipation or GI bleeding   Genitourinary: Denies dysuria or frequency  Musculoskeletal/Rheum: Left hip pain  Skin: Denies rash  Neurological: Denies headache, confusion, memory loss or focal weakness/parasthesias  Psychiatric: denies mood disorder   Endocrine: Malou thyroid problems  Heme/Oncology/Lymph Nodes: Denies enlarged lymph nodes, denies brusing or known bleeding disorder  All other systems were reviewed and are negative (AMA/CMS criteria)                Past Medical History:   History reviewed. No pertinent past medical history.  Active Hospital Problems    Diagnosis   • Closed fracture of left hip (HCC) [S72.002A]   • Macrocytosis [D75.89]   • Hyperglycemia [R73.9]   • BPH (benign prostatic hyperplasia) [N40.0]       Past Surgical History:  Past Surgical History:   Procedure Laterality Date   • RETINAL DETACHMENT  [facility]         Hospital Medications:    Current Facility-Administered Medications:   •  doxazosin (CARDURA) tablet 1 mg, 1 mg, Oral, QHS, Hank Flaherty M.D.  •  senna-docusate (PERICOLACE or SENOKOT S) 8.6-50 MG per tablet 2 Tab, 2 Tab, Oral, BID **AND** polyethylene glycol/lytes (MIRALAX) PACKET 1 Packet, 1 Packet, Oral, QDAY PRN **AND** magnesium hydroxide (MILK OF MAGNESIA) suspension 30 mL, 30 mL, Oral, QDAY PRN **AND** bisacodyl (DULCOLAX) suppository 10 mg, 10 mg, Rectal, QDAY PRN, Hank Flaherty M.D.  •  NS infusion, , Intravenous, Continuous, Hank Flaherty M.D., Last Rate: 100 mL/hr at 09/08/19 7692  •  ondansetron (ZOFRAN) syringe/vial injection 4 mg, 4 mg, Intravenous, Q4HRS PRN, Hank Flaherty M.D.  •  ondansetron (ZOFRAN ODT) dispertab 4 mg, 4 mg, Oral, Q4HRS PRN, Hank Flaherty M.D.  •  promethazine (PHENERGAN) tablet 12.5-25 mg, 12.5-25 mg, Oral, Q4HRS PRN, Hank Flaherty M.D.  •  promethazine (PHENERGAN) suppository 12.5-25 mg, 12.5-25 mg, Rectal, Q4HRS PRN, Hank Flaherty M.D.  •  prochlorperazine (COMPAZINE) injection 5-10 mg, 5-10 mg, Intravenous, Q4HRS PRN, Hank Flaherty M.D.    Current Outpatient Medications:  Medications Prior to Admission   Medication Sig Dispense Refill Last Dose   • doxazosin (CARDURA) 1 MG Tab Take 1 mg by mouth every bedtime.   9/7/2019 at Unknown time       Medication Allergy:  No Known Allergies    Family History:  History reviewed. No pertinent family history.    Social History:  Social History     Socioeconomic History   • Marital status:      Spouse name: Not on file   • Number of children: Not on file   • Years of education: Not on file   • Highest education level: Not on file   Occupational History   • Not on file   Social Needs   • Financial resource strain: Not on file   • Food insecurity:     Worry: Not on file     Inability: Not on file   • Transportation needs:     Medical: Not on file     Non-medical:  "Not on file   Tobacco Use   • Smoking status: Never Smoker   • Smokeless tobacco: Never Used   Substance and Sexual Activity   • Alcohol use: Yes     Comment: occ   • Drug use: Never   • Sexual activity: Not on file   Lifestyle   • Physical activity:     Days per week: Not on file     Minutes per session: Not on file   • Stress: Not on file   Relationships   • Social connections:     Talks on phone: Not on file     Gets together: Not on file     Attends Confucianist service: Not on file     Active member of club or organization: Not on file     Attends meetings of clubs or organizations: Not on file     Relationship status: Not on file   • Intimate partner violence:     Fear of current or ex partner: Not on file     Emotionally abused: Not on file     Physically abused: Not on file     Forced sexual activity: Not on file   Other Topics Concern   • Not on file   Social History Narrative   • Not on file         Physical Exam:  Vitals/ General Appearance:   Weight/BMI: Body mass index is 21.11 kg/m².  /72   Pulse (!) 57   Temp 37.4 °C (99.3 °F) (Temporal)   Resp 16   Ht 1.854 m (6' 1\")   Wt 72.6 kg (160 lb)   SpO2 97%   Vitals:    09/08/19 1405 09/08/19 1604 09/08/19 1800 09/08/19 2100   BP:  110/75 127/73 112/72   Pulse:  84 (!) 50 (!) 57   Resp:  16 16 16   Temp:   37.6 °C (99.6 °F) 37.4 °C (99.3 °F)   TempSrc:   Temporal Temporal   SpO2:  98% 97%    Weight: 72.6 kg (160 lb)      Height:           Constitutional:   Well developed, Well nourished, No acute distress  HENMT:  Normocephalic, Atraumatic, Oropharynx moist mucous membranes, No oral exudates, Nose normal.  No thyromegaly.  Eyes:  EOMI, Conjunctiva normal, No discharge.  Neck:  Normal range of motion, No cervical tenderness,  no JVD.  Cardiovascular:  Regular rate and rhythm  Lungs:  Normal breathing  Abdomen: Soft, non-tender, non-distended.  Skin: Warm, Dry, No erythema, No rash, no induration.  Neurologic: Alert & oriented x 3, No focal deficits " noted, cranial nerves II through X are grossly intact.  Psychiatric: Affect normal, Judgment normal, Mood normal.  Musculoskeletal: Exam of the LLE reveals:  TTP about the left hip  Pain in the groin with log roll   No open wounds  No TTP over the patella  Motor/sensory intact to deep/sup peroneal and tibial nerves  DP pulse 2+    Lab Data Review:  Recent Results (from the past 24 hour(s))   EKG (NOW)    Collection Time: 19  2:39 PM   Result Value Ref Range    Report       [facility] Emergency Dept.    Test Date:  2019  Pt Name:    HUMBERTO RAJPUT    Department: ER  MRN:        [id number]                      Room:       [facility]  Gender:     Male                         Technician: [id number]  :        1960                   Requested By:JESUS CARBONE  Order #:    [id number]                    Reading MD:    Measurements  Intervals                                Axis  Rate:       62                           P:          78  OK:         196                          QRS:        73  QRSD:       86                           T:          76  QT:         412  QTc:        419    Interpretive Statements  SINUS RHYTHM  BORDERLINE ST ELEVATION, INFERIOR LEADS  No previous ECG available for comparison     CBC w/ Differential    Collection Time: 19  3:20 PM   Result Value Ref Range    WBC 5.2 4.8 - 10.8 K/uL    RBC 4.26 (L) 4.70 - 6.10 M/uL    Hemoglobin 14.1 14.0 - 18.0 g/dL    Hematocrit 43.6 42.0 - 52.0 %    .3 (H) 81.4 - 97.8 fL    MCH 33.1 (H) 27.0 - 33.0 pg    MCHC 32.3 (L) 33.7 - 35.3 g/dL    RDW 48.4 35.9 - 50.0 fL    Platelet Count 111 (L) 164 - 446 K/uL    MPV 12.8 9.0 - 12.9 fL    Neutrophils-Polys 71.10 44.00 - 72.00 %    Lymphocytes 16.00 (L) 22.00 - 41.00 %    Monocytes 9.80 0.00 - 13.40 %    Eosinophils 2.30 0.00 - 6.90 %    Basophils 0.60 0.00 - 1.80 %    Immature Granulocytes 0.20 0.00 - 0.90 %    Nucleated RBC 0.00 /100 WBC    Neutrophils (Absolute) 3.69 1.82  - 7.42 K/uL    Lymphs (Absolute) 0.83 (L) 1.00 - 4.80 K/uL    Monos (Absolute) 0.51 0.00 - 0.85 K/uL    Eos (Absolute) 0.12 0.00 - 0.51 K/uL    Baso (Absolute) 0.03 0.00 - 0.12 K/uL    Immature Granulocytes (abs) 0.01 0.00 - 0.11 K/uL    NRBC (Absolute) 0.00 K/uL   Complete Metabolic Panel (CMP)    Collection Time: 09/08/19  3:20 PM   Result Value Ref Range    Sodium 138 135 - 145 mmol/L    Potassium 3.9 3.6 - 5.5 mmol/L    Chloride 105 96 - 112 mmol/L    Co2 24 20 - 33 mmol/L    Anion Gap 9.0 0.0 - 11.9    Glucose 101 (H) 65 - 99 mg/dL    Bun 26 (H) 8 - 22 mg/dL    Creatinine 0.87 0.50 - 1.40 mg/dL    Calcium 9.4 8.5 - 10.5 mg/dL    AST(SGOT) 19 12 - 45 U/L    ALT(SGPT) 13 2 - 50 U/L    Alkaline Phosphatase 49 30 - 99 U/L    Total Bilirubin 1.5 0.1 - 1.5 mg/dL    Albumin 3.8 3.2 - 4.9 g/dL    Total Protein 6.3 6.0 - 8.2 g/dL    Globulin 2.5 1.9 - 3.5 g/dL    A-G Ratio 1.5 g/dL   Prothrombin (PT/INR)    Collection Time: 09/08/19  3:20 PM   Result Value Ref Range    PT 14.3 12.0 - 14.6 sec    INR 1.09 0.87 - 1.13   APTT    Collection Time: 09/08/19  3:20 PM   Result Value Ref Range    APTT 28.1 24.7 - 36.0 sec   ESTIMATED GFR    Collection Time: 09/08/19  3:20 PM   Result Value Ref Range    GFR If African American >60 >60 mL/min/1.73 m 2    GFR If Non African American >60 >60 mL/min/1.73 m 2       Imaging: X-rays of the left hip reveal a displace and shortened left femoral neck fracture    Assessment: Left femoral neck fracture    Plan: Admission by medicine team, appreciate their help  Will plan for left OLIVE tomorrow with Dr. Boyce  Plans for surgery discussed and questions answered  He wishes to proceed

## 2019-09-09 NOTE — OP REPORT
DATE OF SERVICE:  09/09/2019    PREOPERATIVE DIAGNOSES:    1.  Left displaced femoral neck fracture.  2.  Left hip osteoarthritis.    POSTOPERATIVE DIAGNOSES:    1.  Left displaced femoral neck fracture.  2.  Left hip osteoarthritis.    PROCEDURE PERFORMED:  Open treatment of left femoral neck fracture with total   hip arthroplasty.    SURGEON:  Yung Boyce MD    ANESTHESIOLOGIST:  Jose J Sanchez MD    ANESTHESIA:  General.    ESTIMATED BLOOD LOSS:  1300 mL.    ASSISTANT:  Allyson Galvan, certified first assist.    IMPLANTS:  Freedom press-fit total hip arthroplasty with following components:  1.  A 58 mm Trident acetabular shell.  2.  Accolade II size 7 standard 132-degree neck angle femoral stem.  3.  A +4 mm x 28 mm Biolox delta femoral head.  4.  A 28 mm inner diameter, 46 mm outer diameter, MDM polyethylene femoral   head.  5.  MDM acetabular liner.  6.  A 45x6.5 mm acetabular screw.    INDICATION FOR PROCEDURE:  Patient is a 58-year-old male.  He had a fall   several days ago while hiking.  He has been kind of getting around okay, but   then essentially was unable to ambulate and was brought to the emergency   department yesterday.  He was found to have a left displaced femoral neck   fracture.  He was admitted to the hospitalist service.  My colleague, Dr. Arevalo, asked if I would be available for definitive surgical management,   which I was happy to do.  I met the patient and his significant other in the   preop holding area and recommended surgical management and I felt he would   benefit most from total hip arthroplasty given his underlying osteoarthritis   as well as his age and high functioning activity level that this would be   superior to hemiarthroplasty for treatment of this injury.  We discussed   risks, benefits, and alternatives of surgery preoperatively.  He signed   informed consent and wished to proceed as outlined above.    DESCRIPTION OF PROCEDURE:  Patient was met in the  preoperative holding area   and his surgical site was signed.  His consent was confirmed to be accurate.    He was taken back to the operating room and general anesthesia was induced.    Ancef and vancomycin, and tranexamic acid were administered.  He was then   positioned into the right lateral decubitus position with Stulberg   positioners.  The left lower extremity was then prepped and draped in the   usual sterile fashion.  A formal timeout was performed to confirm patient's   correct name, correct surgical site, correct procedure and correct laterality.    A posterior approach to the hip was then performed with a scalpel down   through skin.  Dissection was carried with Bovie cautery down through the   subcutaneous tissues to the iliotibial band and gluteal fascia, which were   split longitudinally.  The piriformis tendon was identified.  A plane was   developed in between gluteus minimus and piriformis.  I then released the   short external rotators and a full thickness sleeve with the posterior capsule   off of the proximal femur.  The femoral neck fracture was identified.  Made   an in situ femoral neck cut with an oscillating saw and then removed the   femoral head, which measured 58 mm in diameter.  I then placed anterior and   inferior acetabular retractors, excised soft tissues around the acetabular rim   as well as the cotyloid fossa and placed a tag suture in the piriformis and   the posterior capsule to help retract those structures.  I then medialized   down to a several millimeters shy of the cotyloid baseplate and expanded   ultimately up to a 58 mm reamer.  There was good peripheral bleeding   subchondral bone.  I then selected a 58 mm Trident acetabular shell and   inserted into place adding about 10 degrees of anteversion compared to his native   Anteversion.  Iconfirmed that the anterior portion of the implant was just below   the anterior rim of the acetabulum.  I then confirmed the cup was  well seated   and placed a single 45x6.5 mm acetabular screw, which had excellent bony   purchase.  I then cleansed and dried the shell and placed the MDM acetabular   liner.  I then started preparing the femur initially with box osteotome   followed by canal finding reamer followed by sequential broaching ultimately   up to a size 7 stem.  He did have quite a bit of bleeding from the femoral   canal, which I was able to tamp it off while leaving the broach in place for a   while and Dr. Sanchez gave him another dose of tranexamic acid.  He did   bleed extensively from the intramedullary canal for a short period of time.    Once the bleeding subsided, I then thoroughly irrigated out the canal again   and trialed ultimately with a size 7 Accolade II femoral stem and a +4 mm neck   length, which he had excellent stability parameters with and clinically   symmetric limb lengths.  I then removed the trial implants, thoroughly   irrigated out the femoral canal and acetabulum once more and inserted the   component Accolade II size 7 femoral stem down until it was well seated.  I   confirmed there was no iatrogenic calcar fracture and trialed once more and   selected a +4x28 mm Biolox delta femoral head and a 46 mm outer diameter   polyethylene head impacted into place and reduced the hip.  The wound was then   soaked in dilute Betadine solution for 3 minutes and irrigated out the wound   once more.  I repaired the posterior capsule and short external rotators   through bone tunnels in the greater trochanter with #5 Tycron, repaired the IT   band and gluteal fascia with size 2 Stratafix suture, subQ layers with 0   Vicryl, 2-0 Vicryl, and the skin edges with staples.  I had injected a total   of 30 mL of 0.5% Marcaine with epinephrine into both the posterior capsule and   the subcutaneous tissue for postoperative analgesia.  I applied a sterile   silver impregnated Mepilex dressing after the wound was closed with  staples.    He was then taken out of the lateral position, awoken from anesthesia and   transferred on the gurney and taken to postanesthesia care unit in stable   condition.    Allyson Galvan was present for the duration of the procedure and assisted with   patient positioning, retracting and wound closure.    PLAN:  1.  Patient will be readmitted to medical service postop.  2.  He should maintain posterior hip precautions at all times.  3.  He will need Ancef and vancomycin for 24 hours postop for routine   infection prophylaxis.  4.  He should work with physical and occupational therapy as soon as possible   for mobilization.  5.  Anticipated discharge to home in the next couple of days if he is   ambulating safely and his pain sufficiently controlled on oral medications.       ____________________________________     MD JOAQUIN Erwin / PAMELLA    DD:  09/09/2019 12:32:21  DT:  09/09/2019 12:54:06    D#:  [id number]  Job#:  [id number]

## 2019-09-09 NOTE — ASSESSMENT & PLAN NOTE
X-ray of the hip showed Impacted and angulated left femoral neck fracture-post left  OLIVE 9/9/2019  Unsteady gait-continue with PT/OT  Pain control-add PRN Ultram  Will arrange for home health  Ortho input

## 2019-09-09 NOTE — ANESTHESIA POSTPROCEDURE EVALUATION
Patient: Marcello Kendrick    Procedure Summary     Date:  09/09/19 Room / Location:  [place] [address] / SURGERY [facility]    Anesthesia Start:  1004 Anesthesia Stop:  1222    Procedure:  ARTHROPLASTY, HIP, TOTAL (Left Hip) Diagnosis:  (left subacute, displaced femoral neck fracture and underlying arthritis.)    Surgeon:  Yung Boyce M.D. Responsible Provider:  Jose J Sanchez M.D.    Anesthesia Type:  general ASA Status:  2          Final Anesthesia Type: general  Last vitals  BP   Blood Pressure: 116/56    Temp   36.6 °C (97.9 °F)    Pulse   Pulse: 67   Resp   15    SpO2   100 %      Anesthesia Post Evaluation    Patient location during evaluation: PACU  Patient participation: complete - patient participated  Level of consciousness: awake and alert    Airway patency: patent  Anesthetic complications: no  Cardiovascular status: hemodynamically stable  Respiratory status: acceptable  Hydration status: euvolemic    PONV: none           Nurse Pain Score: 0 (NPRS)

## 2019-09-09 NOTE — PROGRESS NOTES
Hospital Medicine Daily Progress Note    Date of Service  9/9/2019    Chief Complaint  58 y.o. male admitted 9/8/2019 with left hip pain    Hospital Course   This is a 58 years old male who has no significant past medical history fell when he was hiking landing on his left hip area.  In ER x-ray showed Impacted and angulated left femoral neck fracture.  Orthopedic surgery consulted.  Recommended ORIF.  Interval Problem Update  No acute distress noted.  Pain fairly controlled.  N.p.o. for anticipated surgical intervention this morning.    Consultants/Specialty  Orthopedic surgery    Code Status  Full code    Disposition  Pending clinical course    Review of Systems  Review of Systems   Constitutional: Positive for malaise/fatigue. Negative for chills and fever.   HENT: Negative for ear pain, hearing loss and tinnitus.    Eyes: Negative for blurred vision, double vision and photophobia.   Respiratory: Negative for cough, hemoptysis and sputum production.    Cardiovascular: Negative for chest pain, palpitations and orthopnea.   Gastrointestinal: Negative for heartburn, nausea and vomiting.   Genitourinary: Negative for dysuria, frequency and urgency.   Musculoskeletal: Positive for joint pain (Left hip pain). Negative for back pain, myalgias and neck pain.   Skin: Negative for rash.   Neurological: Negative for dizziness, tingling and headaches.   Psychiatric/Behavioral: Negative for depression, substance abuse and suicidal ideas.        Physical Exam  Temp:  [36.1 °C (97 °F)-37.6 °C (99.6 °F)] 36.6 °C (97.9 °F)  Pulse:  [50-86] 52  Resp:  [16-18] 16  BP: (101-127)/(65-78) 105/65  SpO2:  [97 %-99 %] 99 %    Physical Exam   Constitutional: He is oriented to person, place, and time. No distress.   HENT:   Head: Normocephalic and atraumatic.   Mouth/Throat: No oropharyngeal exudate.   Eyes: Pupils are equal, round, and reactive to light. Conjunctivae are normal. Right eye exhibits no discharge. Left eye exhibits no  discharge.   Neck: No tracheal deviation present. No thyromegaly present.   Cardiovascular: Normal rate and regular rhythm. Exam reveals no gallop and no friction rub.   Pulmonary/Chest: Effort normal. No respiratory distress. He has no wheezes.   Abdominal: Soft. He exhibits no distension. There is no tenderness.   Musculoskeletal:   Left lower extremity shortened and externally rotated  Neurovascular bundle in the left lower extremity is intact   Neurological: He is alert and oriented to person, place, and time.   Skin: Skin is warm and dry. He is not diaphoretic.   Psychiatric: He has a normal mood and affect.       Fluids    Intake/Output Summary (Last 24 hours) at 9/9/2019 1122  Last data filed at 9/9/2019 0600  Gross per 24 hour   Intake --   Output 150 ml   Net -150 ml       Laboratory  Recent Labs     09/08/19  1520 09/09/19  0445   WBC 5.2 5.7   RBC 4.26* 4.31*   HEMOGLOBIN 14.1 14.6   HEMATOCRIT 43.6 43.7   .3* 101.4*   MCH 33.1* 33.9*   MCHC 32.3* 33.4*   RDW 48.4 47.8   PLATELETCT 111* 117*   MPV 12.8 12.5     Recent Labs     09/08/19  1520 09/09/19  0445   SODIUM 138 138   POTASSIUM 3.9 3.6   CHLORIDE 105 105   CO2 24 24   GLUCOSE 101* 97   BUN 26* 24*   CREATININE 0.87 0.80   CALCIUM 9.4 9.2     Recent Labs     09/08/19  1520   APTT 28.1   INR 1.09               Imaging  DX-CHEST-LIMITED (1 VIEW)   Final Result      No evidence of acute cardiopulmonary process.      DX-FEMUR-2+ LEFT   Final Result      Impacted and angulated left femoral neck fracture.           Assessment/Plan  * Closed fracture of left hip (HCC)  Assessment & Plan  X-ray of the hip showed Impacted and angulated left femoral neck fracture  Orthopedic surgery consulted.  Planning for ORIF this morning.      Thrombocytopenia (HCC)- (present on admission)  Assessment & Plan  Unknown baseline  Continue to monitor    BPH (benign prostatic hyperplasia)  Assessment & Plan  Continue Cardura    Hyperglycemia  Assessment & Plan  No  history of diabetes  Check fasting glucose level    Macrocytosis  Assessment & Plan  Will check B12 and folate       VTE prophylaxis: SCDs

## 2019-09-09 NOTE — OR NURSING
Report called to Hillary ANDREA. All pertinent events, medical information and care plan details reported to receiving RN. Reviewed lines. Reviewed hemodynamics, allergies, code status, applicable medications, and pertinent assessment findings including focused LLE assessment. RN educated that patient's strength is diminished in LLE that surgery was performed on and that patient states there is numbness there, though he is able to correctly identify sensation. Receiving RN notified that this RN did inform surgeon of these findings. Receiving RN notified of STAT H&H findings, EBL and fluids received. All questions and concerns addressed. Patient remains HDS on 1L NC, AOx4 at this time. Patient educated on next phase of care.

## 2019-09-09 NOTE — PROGRESS NOTES
2 RN skin check completed with ZULLY Lui  Pressure ulcers: NONE  Scratches observed below left knee and on lower right shin. Heels, nose, elbows, ears intact. SCDs on.

## 2019-09-09 NOTE — OR NURSING
Patient's spouse, Urmila, called per patient request. Patient's family updated via telephone number listed on chart. Patient's family on current POC and patient status. All questions and concerns addressed.

## 2019-09-09 NOTE — OR SURGEON
Immediate Post OP Note    PreOp Diagnosis: Left displaced femoral neck fracture    PostOp Diagnosis: same    Procedure(s):  ARTHROPLASTY, HIP, TOTAL - Wound Class: Clean    Surgeon(s):  Yung Boyce M.D.    Anesthesiologist/Type of Anesthesia:  Anesthesiologist: Jose J Sanchez M.D./General    Surgical Staff:  Circulator: Staecy Leo R.N.; Lukas D. Gansert, R.N.  Relief Scrub: Nakul Corea R.N.  Scrub Person: Tapan Stanton Assist: Allyson Galvan [facility]    Specimens removed if any:  * No specimens in log *    Estimated Blood Loss: 1500cc    Findings: see dictation    Complications: none known    PLAN:  --readmit medicine postop  --WBAT LLE  --posterior hip precautions  --ancef/vanc x 2 doses postop  --PT/OT for mobilization ASAP  --okay to start heparin or equivalent tomorrow if Hct has stabilized        9/9/2019 12:17 PM Yung Boyce M.D.

## 2019-09-09 NOTE — ANESTHESIA PROCEDURE NOTES
Airway  Date/Time: 9/9/2019 10:14 AM  Performed by: Jose J Sanchez M.D.  Authorized by: Jose J Sanchez M.D.     Location:  OR  Urgency:  Elective  Difficult Airway: No    Indications for Airway Management:  Anesthesia  Spontaneous Ventilation: absent    Sedation Level:  Deep  Preoxygenated: Yes    Patient Position:  Sniffing  Mask Difficulty Assessment:  2 - vent by mask + OA or adjuvant +/- NMBA  Final Airway Type:  Endotracheal airway  Final Endotracheal Airway:  ETT  Cuffed: Yes    Technique Used for Successful ETT Placement:  Direct laryngoscopy  Insertion Site:  Oral  Blade Type:  Lavinia  Laryngoscope Blade/Videolaryngoscope Blade Size:  3  ETT Size (mm):  7.5  Measured from:  Teeth  ETT to Teeth (cm):  24  Placement Verified by: auscultation and capnometry    Cormack-Lehane Classification:  Grade IIa - partial view of glottis  Number of Attempts at Approach:  1   Atraumatic DLx1

## 2019-09-09 NOTE — PROGRESS NOTES
Chief Complaint   Patient presents with   • Hip Injury     x 4 days,  Lt. hip injury after fall       HISTORY OF PRESENT ILLNESS: Patient is a 58 y.o. male who presents today for about 3 days of left hip pain after a fall.   Patient was hiking and fell, predominantly striking his left hip.  He has had significantly reduced ROM and is not able to put weight on the left leg.  No numbness or tingling in the extremity.      Patient Active Problem List    Diagnosis Date Noted   • Closed fracture of left hip (HCC) 09/08/2019   • Macrocytosis 09/08/2019   • Hyperglycemia 09/08/2019   • BPH (benign prostatic hyperplasia) 09/08/2019       Allergies:Patient has no known allergies.    Current Facility-Administered Medications Ordered in Epic   Medication Dose Route Frequency Provider Last Rate Last Dose   • [START ON 9/9/2019] doxazosin (CARDURA) tablet 1 mg  1 mg Oral QHS Hank Flaherty M.D.       • [START ON 9/9/2019] senna-docusate (PERICOLACE or SENOKOT S) 8.6-50 MG per tablet 2 Tab  2 Tab Oral BID Hank Flaherty M.D.        And   • polyethylene glycol/lytes (MIRALAX) PACKET 1 Packet  1 Packet Oral QDAY PRN Hank Flaherty M.D.        And   • magnesium hydroxide (MILK OF MAGNESIA) suspension 30 mL  30 mL Oral QDAY PRN Hank Flaherty M.D.        And   • bisacodyl (DULCOLAX) suppository 10 mg  10 mg Rectal QDAY PRN Hank Flaherty M.D.       • NS infusion   Intravenous Continuous Hank Flaherty M.D.       • ondansetron (ZOFRAN) syringe/vial injection 4 mg  4 mg Intravenous Q4HRS PRN Hank Flaherty M.D.       • ondansetron (ZOFRAN ODT) dispertab 4 mg  4 mg Oral Q4HRS PRN Hank Flaherty M.D.       • promethazine (PHENERGAN) tablet 12.5-25 mg  12.5-25 mg Oral Q4HRS PRN Hank Flaherty M.D.       • promethazine (PHENERGAN) suppository 12.5-25 mg  12.5-25 mg Rectal Q4HRS PRN Hank Flaherty M.D.       • prochlorperazine (COMPAZINE) injection 5-10 mg  5-10 mg Intravenous  "Q4HRS ART Flaherty M.D.         No current [facility]-ordered outpatient medications on file.       No past medical history on file.    Social History     Tobacco Use   • Smoking status: Never Smoker   • Smokeless tobacco: Never Used   Substance Use Topics   • Alcohol use: Yes     Comment: occ   • Drug use: Never       No family status information on file.   No family history on file.    ROS:  Review of Systems   Constitutional: Negative for fever, chills, weight loss and malaise/fatigue.    Respiratory: Negative for cough, sputum production, shortness of breath and wheezing.    Cardiovascular: Negative for chest pain, palpitations, orthopnea and leg swelling.   Musculoskeletal: SEE HPI    Exam:  /80 (BP Location: Left arm, Patient Position: Sitting, BP Cuff Size: Adult)   Pulse 98   Temp 37.1 °C (98.7 °F) (Temporal)   Resp 16   Ht 1.854 m (6' 1\")   Wt 72.6 kg (160 lb)   SpO2 99%   General:  Well nourished, well developed male in NAD  Eyes: PERRLA, EOM within normal limits, no conjunctival injection, no scleral icterus  Pulmonary: Normal respiratory effort  Cardiovascular: regular rate   Skin: No visible rashes or lesion. Warm, pink, dry.   Extremities: no clubbing, cyanosis, or edema.  Musculoskeletal:  Significant left hip reduction of ROM.  Patient with mild TTP lateral aspect of hip however no overt swelling, no ecchymosis.  Distal N/V of bilaterally lower extremities equal.  Neuro: A&O x 3. Speech normal/clear.      RAD    Impression       Fracture of the left femoral neck with impaction and angulation.         Assessment/Plan:  1. Closed fracture of left hip, initial encounter (HCC)  DX-HIP-UNILATERAL-WITH PELVIS-1 VIEW LEFT       -RAD as above.   -patient will need surgery and will likely be admitted from ED discussed.   -patient's wife will take patient POV to ED.   -patient left in stable condition.       Erum Lagunas P.A.-C.    "

## 2019-09-09 NOTE — CARE PLAN
Problem: Communication  Goal: The ability to communicate needs accurately and effectively will improve  Outcome: PROGRESSING AS EXPECTED  Pt has been communicating needs appropriately.     Problem: Safety  Goal: Will remain free from injury  Outcome: PROGRESSING AS EXPECTED  Pt is abiding by safety precautions.     Problem: Knowledge Deficit  Goal: Knowledge of disease process/condition, treatment plan, diagnostic tests, and medications will improve  Outcome: PROGRESSING AS EXPECTED  Pt is receptive to information about his care.

## 2019-09-09 NOTE — PROGRESS NOTES
Received report and assumed care of patient. Pt denied pain and other concerns at this time. Pt educated on use of call light to request assistance. Pt is anticipated to have surgery 9/9/19. Will monitor and follow up.

## 2019-09-09 NOTE — ASSESSMENT & PLAN NOTE
Unknown baseline -platelets declined.  Reports no history of liver disease or alcohol use  Monitor CBC, work-up if significant further decline

## 2019-09-09 NOTE — OR NURSING
Lab/phlebotomy called to notify of STAT H&H ordered.    1235: Lab at bedside. Blood draw successful.

## 2019-09-09 NOTE — PROGRESS NOTES
Patient just came to the floor, in minimal to no pain at this moment. Discussed POC with patient and Gaby his wife mentioned he was told by MD downstairs he could eat and drink until midnight.    Spoke to Dr Blanka Cherry and he stated if this is what the Ortho MD stated then move NPO order and fluid order to start at midnight and he can have a regular diet until then.    Confirmed with Ortho NETTA Arellano NPO at midnight, surgery is tomorrow.

## 2019-09-09 NOTE — PROGRESS NOTES
Report received from ZULLY Franoc. Pt currently resting in bed. No complaints of pain at this time. Wife is at bed side. Will continue to monitor

## 2019-09-09 NOTE — OR NURSING
Dr. Boyce paged d/t patient report of numbness in LLE. Patient's dorsiflexion and plantar flexion diminished compared to RLE. Movement is still achieved, but patient states it is somewhat difficult. Patient can distinguish sensation stimulation between BLE. Peripheral vascular intact and WNL. Per Dr. Boyce, this is WNL at the moment d/t local anesthesia used. Dr Boyce states he will check on patient later in day.

## 2019-09-09 NOTE — ANESTHESIA QCDR
2019 [facility] Clinical Data Registry (for Quality Improvement)     Postoperative nausea/vomiting risk protocol (Adult = 18 yrs and Pediatric 3-17 yrs)- (430 and 463)  General inhalation anesthetic (NOT TIVA) with PONV risk factors: No  Provision of anti-emetic therapy with at least 2 different classes of agents: N/A  Patient DID NOT receive anti-emetic therapy and reason is documented in Medical Record: N/A    Multimodal Pain Management- (AQI59)  Patient undergoing Elective Surgery (i.e. Outpatient, or ASC, or Prescheduled Surgery prior to Hospital Admission): No  Use of Multimodal Pain Management, two or more drugs and/or interventions, NOT including systemic opioids: N/A  Exception: Documented allergy to multiple classes of analgesics: N/A    PACU assessment of acute postoperative pain prior to Anesthesia Care End- Applies to Patients Age = 18- (ABG7)  Initial PACU pain score is which of the following: < 7/10  Patient unable to report pain score: N/A    Post-anesthetic transfer of care checklist/protocol to PACU/ICU- (426 and 427)  Upon conclusion of case, patient transferred to which of the following locations: PACU/Non-ICU  Use of transfer checklist/protocol: Yes  Exclusion: Service Performed in Patient Hospital Room (and thus did not require transfer): N/A    PACU Reintubation- (AQI31)  General anesthesia requiring endotracheal intubation (ETT) along with subsequent extubation in OR or PACU: Yes  Required reintubation in the PACU: No   Extubation was a planned trial documented in the medical record prior to removal of the original airway device:  N/A    Unplanned admission to ICU related to anesthesia service up through end of PACU care- (MD51)  Unplanned admission to ICU (not initially anticipated at anesthesia start time): No

## 2019-09-09 NOTE — ANESTHESIA TIME REPORT
Anesthesia Start and Stop Event Times     Date Time Event    9/9/2019 0947 Ready for Procedure     1004 Anesthesia Start     1222 Anesthesia Stop        Responsible Staff  09/09/19    Name Role Begin End    Jose J Sanchez M.D. Anesth 1004 1222        Preop Diagnosis (Free Text):  Pre-op Diagnosis     left subacute, displaced femoral neck fracture and underlying arthritis.        Preop Diagnosis (Codes):    Post op Diagnosis  Displaced fracture of left femoral neck (HCC)      Premium Reason  Non-Premium    Comments:

## 2019-09-09 NOTE — PROGRESS NOTES
58yoM with left subacute, displaced femoral neck fracture and underlying arthritis.    S: comfortable in bed in preop holding, wife at bedside.    O:  Vitals:    09/09/19 0849   BP: 105/65   Pulse: (!) 52   Resp: 16   Temp: 36.6 °C (97.9 °F)   SpO2: 99%     Exam:  General-NAD, alert and following commands  LLE-shortened and externally rotated, +EHL/FHL/TA/GS motor, SILT diffusely in foot, palp dp pulse    A: 58yoM with left subacute, displaced femoral neck fracture and underlying arthritis.    Recs:  --We discussed his injury and I recommend OLIVE.  We discussed risks of surgery including infection, fracture, hip instability, limb length inequality, DVT/PE, blood loss possibly requiring transfusion, potential need for revision surgery in the future, neurovascular injury and general risks of anesthesia.  He expressed understanding and wishes to proceed with surgery.  --NPO planning left OLIVE today

## 2019-09-09 NOTE — H&P
Hospital Medicine History & Physical Note    Date of Service  9/8/2019    Primary Care Physician  Hillary Allen M.D.    Consultants  Ortho Dr Mckeon    Code Status  Full code    Chief Complaint  Fall with hip pain    History of Presenting Illness  58 y.o. male who presented 9/8/2019 with no history of chronic medical problems was hiking when he tripped and fell hitting his left hip on a rock about 3 days ago.  He called his wife to pick him up as he was having severe left hip pain over the past 3 days he was using his crutches however continued to have severe hip pain associated with muscle spasms with any movement or weightbearing activity so he presented to the emergency room.  His pain is sharp it is worse with movement it radiates to his lateral hip partially relieved by rest.  He denies any chest pain he denies any back or neck pain he denies any headache or loss of consciousness.    Review of Systems  Review of Systems   All other systems reviewed and are negative.      Past Medical History  Negative per patient  Surgical History   has a past surgical history that includes retinal detachment repair.     Family History  Reviewed and not pertinent to the presenting problem    Social History   reports that he has never smoked. He has never used smokeless tobacco. He reports that he drinks alcohol. He reports that he does not use drugs.    Allergies  No Known Allergies    Medications  Prior to Admission Medications   Prescriptions Last Dose Informant Patient Reported? Taking?   doxazosin (CARDURA) 1 MG Tab 9/7/2019 at Unknown time  Yes No   Sig: Take 1 mg by mouth every bedtime.      Facility-Administered Medications: None       Physical Exam  Temp:  [37 °C (98.6 °F)-37.6 °C (99.6 °F)] 37.6 °C (99.6 °F)  Pulse:  [50-86] 50  Resp:  [16] 16  BP: (103-127)/(73-78) 127/73  SpO2:  [97 %-98 %] 97 %    Physical Exam   Constitutional: He is oriented to person, place, and time. He appears well-developed and  well-nourished.   HENT:   Head: Normocephalic and atraumatic.   Right Ear: External ear normal.   Left Ear: External ear normal.   Mouth/Throat: No oropharyngeal exudate.   Eyes: Conjunctivae are normal. Right eye exhibits no discharge. Left eye exhibits no discharge. No scleral icterus.   Neck: Neck supple. No JVD present. No tracheal deviation present.   Cardiovascular: Normal rate and regular rhythm. Exam reveals no gallop and no friction rub.   No murmur heard.  Pulmonary/Chest: Effort normal and breath sounds normal. No stridor. No respiratory distress. He has no wheezes. He has no rales. He exhibits no tenderness.   Abdominal: Soft. Bowel sounds are normal. He exhibits no distension and no mass. There is no tenderness. There is no rebound and no guarding.   Musculoskeletal: He exhibits tenderness (Left hip area). He exhibits no edema.   Neurological: He is alert and oriented to person, place, and time. No cranial nerve deficit. He exhibits normal muscle tone.   Skin: Skin is warm and dry. He is not diaphoretic. No cyanosis. Nails show no clubbing.   Psychiatric: He has a normal mood and affect. His behavior is normal. Thought content normal.   Nursing note and vitals reviewed.      Laboratory:  Recent Labs     09/08/19  1520   WBC 5.2   RBC 4.26*   HEMOGLOBIN 14.1   HEMATOCRIT 43.6   .3*   MCH 33.1*   MCHC 32.3*   RDW 48.4   PLATELETCT 111*   MPV 12.8     Recent Labs     09/08/19  1520   SODIUM 138   POTASSIUM 3.9   CHLORIDE 105   CO2 24   GLUCOSE 101*   BUN 26*   CREATININE 0.87   CALCIUM 9.4     Recent Labs     09/08/19  1520   ALTSGPT 13   ASTSGOT 19   ALKPHOSPHAT 49   TBILIRUBIN 1.5   GLUCOSE 101*     Recent Labs     09/08/19  1520   APTT 28.1   INR 1.09     No results for input(s): NTPROBNP in the last 72 hours.      No results for input(s): TROPONINT in the last 72 hours.    Urinalysis:    No results found     Imaging:  DX-CHEST-LIMITED (1 VIEW)   Final Result      No evidence of acute  cardiopulmonary process.      DX-FEMUR-2+ LEFT   Final Result      Impacted and angulated left femoral neck fracture.            Assessment/Plan:  I anticipate this patient will require at least two midnights for appropriate medical management, necessitating inpatient admission.    * Closed fracture of left hip (HCC)  Assessment & Plan  Patient will be admitted started on pain management  He will be kept on bedrest  Will await orthopedic evaluation he would likely require surgical treatment of his hip fracture  EKG chest x-ray and hip x-ray reviewed  Patient can proceed with surgical treatment with no further preoperative work-up indicated      BPH (benign prostatic hyperplasia)  Assessment & Plan  Continue Cardura    Hyperglycemia  Assessment & Plan  Check fasting glucose level    Macrocytosis  Assessment & Plan  Will check B12 and folate      Plan of care reviewed with patient and wife and the questions answered    VTE prophylaxis: SCD

## 2019-09-10 PROBLEM — D64.9 ANEMIA: Status: ACTIVE | Noted: 2019-09-10

## 2019-09-10 LAB
ANION GAP SERPL CALC-SCNC: 5 MMOL/L (ref 0–11.9)
BUN SERPL-MCNC: 20 MG/DL (ref 8–22)
CALCIUM SERPL-MCNC: 8 MG/DL (ref 8.5–10.5)
CHLORIDE SERPL-SCNC: 105 MMOL/L (ref 96–112)
CO2 SERPL-SCNC: 25 MMOL/L (ref 20–33)
CREAT SERPL-MCNC: 0.69 MG/DL (ref 0.5–1.4)
ERYTHROCYTE [DISTWIDTH] IN BLOOD BY AUTOMATED COUNT: 45.2 FL (ref 35.9–50)
GLUCOSE SERPL-MCNC: 134 MG/DL (ref 65–99)
HCT VFR BLD AUTO: 27.2 % (ref 42–52)
HGB BLD-MCNC: 9.3 G/DL (ref 14–18)
MCH RBC QN AUTO: 34.1 PG (ref 27–33)
MCHC RBC AUTO-ENTMCNC: 34.2 G/DL (ref 33.7–35.3)
MCV RBC AUTO: 99.6 FL (ref 81.4–97.8)
PLATELET # BLD AUTO: 86 K/UL (ref 164–446)
PMV BLD AUTO: 12.6 FL (ref 9–12.9)
POTASSIUM SERPL-SCNC: 3.8 MMOL/L (ref 3.6–5.5)
RBC # BLD AUTO: 2.73 M/UL (ref 4.7–6.1)
SODIUM SERPL-SCNC: 135 MMOL/L (ref 135–145)
WBC # BLD AUTO: 8 K/UL (ref 4.8–10.8)

## 2019-09-10 PROCEDURE — A9270 NON-COVERED ITEM OR SERVICE: HCPCS | Performed by: INTERNAL MEDICINE

## 2019-09-10 PROCEDURE — 700102 HCHG RX REV CODE 250 W/ 637 OVERRIDE(OP): Performed by: INTERNAL MEDICINE

## 2019-09-10 PROCEDURE — A9270 NON-COVERED ITEM OR SERVICE: HCPCS | Performed by: HOSPITALIST

## 2019-09-10 PROCEDURE — 700111 HCHG RX REV CODE 636 W/ 250 OVERRIDE (IP): Performed by: ORTHOPAEDIC SURGERY

## 2019-09-10 PROCEDURE — 97165 OT EVAL LOW COMPLEX 30 MIN: CPT

## 2019-09-10 PROCEDURE — 770001 HCHG ROOM/CARE - MED/SURG/GYN PRIV*

## 2019-09-10 PROCEDURE — 700102 HCHG RX REV CODE 250 W/ 637 OVERRIDE(OP): Performed by: HOSPITALIST

## 2019-09-10 PROCEDURE — 700105 HCHG RX REV CODE 258: Performed by: HOSPITALIST

## 2019-09-10 PROCEDURE — 97161 PT EVAL LOW COMPLEX 20 MIN: CPT

## 2019-09-10 PROCEDURE — 80048 BASIC METABOLIC PNL TOTAL CA: CPT

## 2019-09-10 PROCEDURE — 36415 COLL VENOUS BLD VENIPUNCTURE: CPT

## 2019-09-10 PROCEDURE — 99232 SBSQ HOSP IP/OBS MODERATE 35: CPT | Performed by: HOSPITALIST

## 2019-09-10 PROCEDURE — 85027 COMPLETE CBC AUTOMATED: CPT

## 2019-09-10 RX ORDER — ACETAMINOPHEN 325 MG/1
650 TABLET ORAL EVERY 4 HOURS PRN
Status: DISCONTINUED | OUTPATIENT
Start: 2019-09-10 | End: 2019-09-11 | Stop reason: HOSPADM

## 2019-09-10 RX ADMIN — ACETAMINOPHEN 650 MG: 325 TABLET, FILM COATED ORAL at 03:23

## 2019-09-10 RX ADMIN — ACETAMINOPHEN 650 MG: 325 TABLET, FILM COATED ORAL at 15:37

## 2019-09-10 RX ADMIN — CEFAZOLIN SODIUM 2 G: 2 INJECTION, SOLUTION INTRAVENOUS at 03:23

## 2019-09-10 RX ADMIN — DOXAZOSIN 1 MG: 2 TABLET ORAL at 19:55

## 2019-09-10 RX ADMIN — ACETAMINOPHEN 650 MG: 325 TABLET, FILM COATED ORAL at 19:55

## 2019-09-10 RX ADMIN — SODIUM CHLORIDE: 9 INJECTION, SOLUTION INTRAVENOUS at 03:23

## 2019-09-10 ASSESSMENT — COGNITIVE AND FUNCTIONAL STATUS - GENERAL
TURNING FROM BACK TO SIDE WHILE IN FLAT BAD: A LOT
SUGGESTED CMS G CODE MODIFIER MOBILITY: CL
WALKING IN HOSPITAL ROOM: A LITTLE
MOVING TO AND FROM BED TO CHAIR: UNABLE
MOVING FROM LYING ON BACK TO SITTING ON SIDE OF FLAT BED: A LOT
TOILETING: A LITTLE
MOBILITY SCORE: 13
CLIMB 3 TO 5 STEPS WITH RAILING: A LOT
SUGGESTED CMS G CODE MODIFIER DAILY ACTIVITY: CJ
DAILY ACTIVITIY SCORE: 21
DRESSING REGULAR LOWER BODY CLOTHING: A LITTLE
STANDING UP FROM CHAIR USING ARMS: A LITTLE
HELP NEEDED FOR BATHING: A LITTLE

## 2019-09-10 ASSESSMENT — ENCOUNTER SYMPTOMS
NECK PAIN: 0
EYE DISCHARGE: 0
FALLS: 1
TREMORS: 0
DIAPHORESIS: 0
EYE REDNESS: 0
STRIDOR: 0
SPEECH CHANGE: 0
WHEEZING: 0
SENSORY CHANGE: 0
PALPITATIONS: 0
CHILLS: 0
FEVER: 0
COUGH: 0
ABDOMINAL PAIN: 0
WEAKNESS: 0
DIARRHEA: 0
VOMITING: 0
DIZZINESS: 1
SHORTNESS OF BREATH: 0
MYALGIAS: 1
HALLUCINATIONS: 0
FLANK PAIN: 0
FOCAL WEAKNESS: 0
BACK PAIN: 0

## 2019-09-10 ASSESSMENT — GAIT ASSESSMENTS
ASSISTIVE DEVICE: FRONT WHEEL WALKER
DISTANCE (FEET): 15
GAIT LEVEL OF ASSIST: SUPERVISED
DEVIATION: ANTALGIC;STEP TO;BRADYKINETIC;DECREASED HEEL STRIKE;DECREASED TOE OFF

## 2019-09-10 ASSESSMENT — ACTIVITIES OF DAILY LIVING (ADL): TOILETING: INDEPENDENT

## 2019-09-10 ASSESSMENT — LIFESTYLE VARIABLES: SUBSTANCE_ABUSE: 0

## 2019-09-10 NOTE — THERAPY
"Physical Therapy Evaluation completed.   Bed Mobility:  Supine to Sit: (NT, in BR upon arrival)  Transfers: Sit to Stand: Supervised  Gait: Level Of Assist: 15ft, Supervised with Front-Wheel Walker       Plan of Care: Will benefit from Physical Therapy 4 times per week  Discharge Recommendations: Equipment: Will Continue to Assess for Equipment Needs. Recommend home health transitional care for continued physical therapy services.     See \"Rehab Therapy-Acute\" Patient Summary Report for complete documentation.     Pt is a 59yo male s/p L OLIVE on 9/9 after GLF sustaining L displaced femoral neck fx. LLE WBAT with posterior hip precautions. Educated and provided handout regarding posterior hip precautions, pt and spouse with good understanding. Upon entry, pt in bathroom, observed ambulation back to bed with FWW, no assist. Pt with poor weight shifting onto LLE, tends to drag L foot along. Pt complains of dizziness that does not subside with time. Attempted to take BP, pt too symptomatic to relax, unable to obtain reading. Min A from spouse to return to bed. Pt with good social support at home, anticipate will be able to return home after continued acute PT. May benefit from home health PT. Will continue to follow to assess for gait and stair training.   "

## 2019-09-10 NOTE — PROGRESS NOTES
[facility] Medicine Daily Progress Note    Date of Service  9/10/2019    Chief Complaint  58 y.o. male admitted 9/8/2019 with left hip pain after fall.    Hospital Course    58-year-old male admitted after he tripped and fell while hiking admitted with findings of acute left hip fracture.  Patient consulted by orthopedics.    Interval Problem Update    Post left hip ORIF yesterday.  Reports pain better controlled.  Platelets declined.  Has been up with use of walker.  Dizziness improved, anxious to go home.    Consultants/Specialty  Dr. Boyce, surgery    Code Status    Full code    Disposition  Anticipate DC home in stable    Review of Systems  Review of Systems   Constitutional: Negative for chills, diaphoresis, fever and malaise/fatigue.   HENT: Negative for congestion.    Eyes: Negative for discharge and redness.   Respiratory: Negative for cough, shortness of breath, wheezing and stridor.    Cardiovascular: Negative for chest pain, palpitations and leg swelling.   Gastrointestinal: Negative for abdominal pain, diarrhea and vomiting.   Genitourinary: Negative for flank pain and hematuria.   Musculoskeletal: Positive for falls, joint pain and myalgias. Negative for back pain and neck pain.   Neurological: Positive for dizziness. Negative for tremors, sensory change, speech change, focal weakness and weakness.   Psychiatric/Behavioral: Negative for hallucinations and substance abuse.        Physical Exam  Temp:  [35.8 °C (96.5 °F)-37.4 °C (99.4 °F)] 36.4 °C (97.5 °F)  Pulse:  [57-75] 67  Resp:  [13-21] 18  BP: ()/(56-89) 105/69  SpO2:  [96 %-100 %] 96 %    Physical Exam   Constitutional: He is oriented to person, place, and time. No distress.   HENT:   Head: Normocephalic and atraumatic.   Nose: Nose normal.   Eyes: Conjunctivae and EOM are normal. No scleral icterus.   Neck: Normal range of motion. No JVD present.   Cardiovascular: Normal rate and regular rhythm.   No murmur heard.  Pulmonary/Chest: Effort  normal. No stridor. No respiratory distress. He has no wheezes. He has no rales.   Abdominal: Soft. Bowel sounds are normal. He exhibits no distension. There is no tenderness.   Musculoskeletal: He exhibits edema. He exhibits no tenderness.   Left hip surgical incision with overlying dressing, distal extremity warm, perfusing  Generalized 4/5 strength   Neurological: He is alert and oriented to person, place, and time.   No gross focal weakness   Skin: Skin is warm and dry. He is not diaphoretic. No pallor.   Psychiatric: He has a normal mood and affect. His behavior is normal.   Vitals reviewed.      Fluids    Intake/Output Summary (Last 24 hours) at 9/10/2019 1259  Last data filed at 9/10/2019 0400  Gross per 24 hour   Intake 75 ml   Output 801 ml   Net -726 ml       Laboratory  Recent Labs     09/08/19  1520 09/09/19  0445 09/09/19  1232 09/10/19  0807   WBC 5.2 5.7  --  8.0   RBC 4.26* 4.31*  --  2.73*   HEMOGLOBIN 14.1 14.6 10.9* 9.3*   HEMATOCRIT 43.6 43.7 33.1* 27.2*   .3* 101.4*  --  99.6*   MCH 33.1* 33.9*  --  34.1*   MCHC 32.3* 33.4*  --  34.2   RDW 48.4 47.8  --  45.2   PLATELETCT 111* 117*  --  86*   MPV 12.8 12.5  --  12.6     Recent Labs     09/08/19  1520 09/09/19  0445 09/10/19  0807   SODIUM 138 138 135   POTASSIUM 3.9 3.6 3.8   CHLORIDE 105 105 105   CO2 24 24 25   GLUCOSE 101* 97 134*   BUN 26* 24* 20   CREATININE 0.87 0.80 0.69   CALCIUM 9.4 9.2 8.0*     Recent Labs     09/08/19  1520   APTT 28.1   INR 1.09               Imaging  DX-PELVIS-1 OR 2 VIEWS   Final Result      Recent placement of LEFT hip prosthesis, without associated fracture.      DX-CHEST-LIMITED (1 VIEW)   Final Result      No evidence of acute cardiopulmonary process.      DX-FEMUR-2+ LEFT   Final Result      Impacted and angulated left femoral neck fracture.           Assessment/Plan  * Closed fracture of left hip (HCC)  Assessment & Plan  X-ray of the hip showed Impacted and angulated left femoral neck fracture-post  left  OLIVE 9/9/2019  Unsteady gait-continue with PT/OT  Pain control-add PRN Ultram  Will arrange for home health  Ortho input        Anemia  Assessment & Plan  Acute blood loss, dilutional  Hl ivfs as good intake  Fu Hgb- tx if < 7 or symptoms      Thrombocytopenia (HCC)- (present on admission)  Assessment & Plan  Unknown baseline -platelets declined.  Reports no history of liver disease or alcohol use  Monitor CBC, work-up if significant further decline    BPH (benign prostatic hyperplasia)  Assessment & Plan  Continue Cardura    Hyperglycemia  Assessment & Plan  No history of diabetes, improved  Follow-up a.m. blood sugar    Macrocytosis  Assessment & Plan  B12 and folate normal  Check thyroid function       VTE prophylaxis: SCDs    Discussed with multidisciplinary team plan of care.

## 2019-09-10 NOTE — PROGRESS NOTES
"Patient rung call light for assistance to go to the restroom, was very agitated stating he wanted to have his IV removed because he was needing to get up to the restroom too much. This RN removed IV fluids so patient could go into restroom, patient was not allowing for assistance out of bed despite verbalizing that he felt dizzy. Attempted to have patient use urinal, but patient refused. Patient allowed for assistance to restroom. Patient verbalized not wanting to get fluids back up and running. Provided education that he had a 0300 dose of antibiotic. Patient verbalizing understanding but continues to refuse. He was very agitated stating \"I haven't gotten any rest, please give me everything later in the morning\" Notified him we needed to recheck his Hgb as he lost blood during surgery and we needed to determine if he needed blood, which could possibly be causing his dizziness. Patient again asked to have his labs rescheduled  "

## 2019-09-10 NOTE — CARE PLAN
Problem: Communication  Goal: The ability to communicate needs accurately and effectively will improve  Outcome: PROGRESSING AS EXPECTED  Intervention: Educate patient and significant other/support system about the plan of care, procedures, treatments, medications and allow for questions  Note:   Patient up to date about plan of care and procedures, verbalizes understanding.      Problem: Safety  Goal: Will remain free from falls  Outcome: PROGRESSING AS EXPECTED  Intervention: Assess risk factors for falls  Note:   Patient calls appropriately when needing assistance to get out of bed. Treaded footwear applied, bed is locked and in the lowest position, call light within reach. Hourly rounding in place.

## 2019-09-10 NOTE — THERAPY
"Occupational Therapy Evaluation completed.   Functional Status: Up self in bathroom, walking w/fww and spv, c/o dizziness, txf to EOB w/min A. Pt did appear pallor, dizziness and fatigued BP after sitting was 92/50. Educated on posterior hip precautions, will need reinforcement. Completed sit>stand w/spv however d/t on going symptoms and inability to get BP, deferred further nobility and ADL's. Txf BTB w/min A from spouse.   Plan of Care: Will benefit from Occupational Therapy 4 times per week  Discharge Recommendations:  Equipment: Will Continue to Assess for Equipment Needs. Post-acute therapy Recommend home health transitional care for continued occupational therapy services.       See \"Rehab Therapy-Acute\" Patient Summary Report for complete documentation.      58 yr old male admitted after fall ~4 days ago, pt has no significant medical hx pt is dx w/left displaced femoral neck fracture pt is s/p OLIVE, is POD #1 WBAT w/posterior hip precautions. Pt is demonstrated post op pain, new restrictions and generalized weakness and dizziness impacting ADL's. Anticipate pt will progress in this setting and recommend    "

## 2019-09-10 NOTE — PROGRESS NOTES
"Received call from CT notifying this RN of STAT CT order placed. Patient updated that CT transportation will be arriving soon. Patient refusing to go down to CT now as he states \"I don't have any numbness anymore, it is not necessary\" Attempted to provide education about importance of CT as MD Boyce wants images for further evaluation, patient continues to deny states \"If I have numbness tomorrow I will let the doctor know\" Assessed patients extremity, CMS intact. Patient able to dorsiflex foot with mild discomfort, states \"Its feeling better\" Called CT to cancel, will continue to monitor.   "

## 2019-09-10 NOTE — PROGRESS NOTES
"RN discussed CT order with pt and wife. Pt stated, \"I won't do it until I talk with Dr. Boyce\". CT called and updated on situation.   "

## 2019-09-10 NOTE — PROGRESS NOTES
58yoM with left subacute, displaced femoral neck fracture and underlying arthritis s/p OLIVE 9/9.    S: not much pain at rest, some pain with weightbearing, numbness and motor deficits in foot have resolved, is complaining of urinary frequency and dizziness when he stands up.    O:  Vitals:    09/10/19 0800   BP: 105/69   Pulse: 67   Resp: 18   Temp: 36.4 °C (97.5 °F)   SpO2:      Exam:  General-NAD, alert and following commands  LLE-dressing c/d/i, normal active EHL/FHL/TA/GS motor, SILT plantarly in foot, palp dp pulse    Hct: 27.2     A: 58yoM with left subacute, displaced femoral neck fracture and underlying arthritis s/p OLIVE 9/9.  Postop peroneal branch sciatic nerve palsy now resolved, was likely related to injection of local anesthetic.  Acute postop blood loss anemia, mildly symptomatic when ambulating.    Recs:  --WBAT LLE  --posterior hip precautions  --continue SCDs, monitor Hgb and okay to start lovenox when stabilized  --consider transfusion if symptomatic anemia fails to respond to fluid resuscitation  --PT/OT for mobilization ASAP  --fu 2 weeks postop

## 2019-09-10 NOTE — PROGRESS NOTES
58yoM with left subacute, displaced femoral neck fracture and underlying arthritis s/p OLIVE today.    S: Numbness on dorsum of foot and difficultly dorsiflexing foot postop, otherwise seems to be doing pretty well.     O:  Vitals:    09/09/19 1630   BP: (!) 98/61   Pulse: (!) 57   Resp: 17   Temp: 35.8 °C (96.5 °F)   SpO2: 96%     Exam:  General-NAD, alert and following commands  LLE-dressing c/d/i, No active EHL/TA motor, diminished sensation dorsally in foot, +FHL/GS motor, SILT plantarly in foot, palp dp pulse    Hct: 33.1 postop    A: 58yoM with left subacute, displaced femoral neck fracture and underlying arthritis s/p OLIVE today.  Postop peroneal branch sciatic nerve palsy, possibly related to intraoperative retraction vs. injection of local anesthetic.     Recs:  --We discussed the potential etiology of his sciatic nerve palsy, if no improvement by tomorrow morning will order CT hip to evaluate implant/screw positioning  --WBAT LLE  --posterior hip precautions  --ancef/vanc x 2 doses postop  --continue SCDs  --PT/OT for mobilization ASAP

## 2019-09-10 NOTE — FACE TO FACE
Face to Face Note  -  Durable Medical Equipment    Octavio Garcia M.D. - NPI: [id number]  I certify that this patient is under my care and that they have had a durable medical equipment(DME)face to face encounter by myself that meets the physician DME face-to-face encounter requirements with this patient on:    Date of encounter:   Patient:                    MRN:                       YOB: 2021  Marcello Kendrick  [id number]  1960     The encounter with the patient was in whole, or in part, for the following medical condition, which is the primary reason for durable medical equipment:  Total Joint Replacement    I certify that, based on my findings, the following durable medical equipment is medically necessary:  Walkers.    HOME O2 Saturation Measurements:(Values must be present for Home Oxygen orders)         ,     ,         My Clinical findings support the need for the above equipment due to:  Abnormal Gait    Supporting Symptoms: hip fx with weakness, unsteady gait     ------------------------------------------------------------------------------------------------------------------    Face to Face Supporting Documentation - Home Health    The encounter with this patient was in whole or in part the primary reason for home health admission.    Date of encounter:   Patient:                    MRN:                       YOB: 2021  Marcello Kendrick  [id number]  1960     Home health to see patient for:  Physical Therapy evaluation and treatment and Occupational therapy evaluation and treatment    Skilled need for:  Surgical Aftercare Hip fx ORIF wt PT/OT    Skilled nursing interventions to include:  Comment: PT/OT    Homebound evidenced status by:  Need the aid of supportive devices such as crutches, canes, wheelchairs or walkers or Needs the assistance of another person in order to leave the home   . Leaving home must require a considerable and taxing effort.  There must exist a normal inability to leave the home.    Community Physician to provide follow up care: Hillary Allen M.D.     Optional Interventions    Wound information & treatment:    Home Infusion Therapy orders:    Line/Drain/Airway:    I certify the face to face encounter for this home care referral meets the CMS requirements and the encounter/clinical assessment with the patient was, in whole, or in part, for the medical condition(s) listed above, which is the primary reason for home health care. Based on my clinical findings: the service(s) are medically necessary, support the need for home health care, and the homebound criteria are met.  I certify that this patient has had a face to face encounter by myself.  Octavio Garcia M.D. - NPI: [id number]    *Debility, frailty and advanced age in the absence of an acute deterioration or exacerbation of a condition do not qualify a patient for home health.

## 2019-09-10 NOTE — CARE PLAN
Problem: Communication  Goal: The ability to communicate needs accurately and effectively will improve  Outcome: PROGRESSING AS EXPECTED     Problem: Safety  Goal: Will remain free from injury  Outcome: PROGRESSING AS EXPECTED  Bed locked and in lowest position. Call light within reach

## 2019-09-11 VITALS
HEART RATE: 69 BPM | WEIGHT: 160.05 LBS | HEIGHT: 73 IN | TEMPERATURE: 98.9 F | SYSTOLIC BLOOD PRESSURE: 102 MMHG | RESPIRATION RATE: 17 BRPM | DIASTOLIC BLOOD PRESSURE: 67 MMHG | OXYGEN SATURATION: 98 % | BODY MASS INDEX: 21.21 KG/M2

## 2019-09-11 LAB
BASOPHILS # BLD AUTO: 0.1 % (ref 0–1.8)
BASOPHILS # BLD: 0.01 K/UL (ref 0–0.12)
EOSINOPHIL # BLD AUTO: 0.01 K/UL (ref 0–0.51)
EOSINOPHIL NFR BLD: 0.1 % (ref 0–6.9)
ERYTHROCYTE [DISTWIDTH] IN BLOOD BY AUTOMATED COUNT: 46.5 FL (ref 35.9–50)
HCT VFR BLD AUTO: 26.3 % (ref 42–52)
HGB BLD-MCNC: 9 G/DL (ref 14–18)
IMM GRANULOCYTES # BLD AUTO: 0.03 K/UL (ref 0–0.11)
IMM GRANULOCYTES NFR BLD AUTO: 0.4 % (ref 0–0.9)
LYMPHOCYTES # BLD AUTO: 0.49 K/UL (ref 1–4.8)
LYMPHOCYTES NFR BLD: 6.8 % (ref 22–41)
MCH RBC QN AUTO: 34.2 PG (ref 27–33)
MCHC RBC AUTO-ENTMCNC: 34.2 G/DL (ref 33.7–35.3)
MCV RBC AUTO: 100 FL (ref 81.4–97.8)
MONOCYTES # BLD AUTO: 0.57 K/UL (ref 0–0.85)
MONOCYTES NFR BLD AUTO: 8 % (ref 0–13.4)
NEUTROPHILS # BLD AUTO: 6.05 K/UL (ref 1.82–7.42)
NEUTROPHILS NFR BLD: 84.6 % (ref 44–72)
NRBC # BLD AUTO: 0 K/UL
NRBC BLD-RTO: 0 /100 WBC
PLATELET # BLD AUTO: 88 K/UL (ref 164–446)
PMV BLD AUTO: 12.9 FL (ref 9–12.9)
RBC # BLD AUTO: 2.63 M/UL (ref 4.7–6.1)
TSH SERPL DL<=0.005 MIU/L-ACNC: 0.98 UIU/ML (ref 0.38–5.33)
WBC # BLD AUTO: 7.2 K/UL (ref 4.8–10.8)

## 2019-09-11 PROCEDURE — 84443 ASSAY THYROID STIM HORMONE: CPT

## 2019-09-11 PROCEDURE — 97116 GAIT TRAINING THERAPY: CPT

## 2019-09-11 PROCEDURE — 36415 COLL VENOUS BLD VENIPUNCTURE: CPT

## 2019-09-11 PROCEDURE — 97535 SELF CARE MNGMENT TRAINING: CPT

## 2019-09-11 PROCEDURE — 85025 COMPLETE CBC W/AUTO DIFF WBC: CPT

## 2019-09-11 PROCEDURE — 97530 THERAPEUTIC ACTIVITIES: CPT

## 2019-09-11 PROCEDURE — 97110 THERAPEUTIC EXERCISES: CPT

## 2019-09-11 PROCEDURE — 99285 EMERGENCY DEPT VISIT HI MDM: CPT

## 2019-09-11 PROCEDURE — 99239 HOSP IP/OBS DSCHRG MGMT >30: CPT | Performed by: HOSPITALIST

## 2019-09-11 RX ORDER — ASPIRIN 325 MG
325 TABLET ORAL EVERY 12 HOURS
Qty: 60 TAB | Refills: 0 | Status: SHIPPED | OUTPATIENT
Start: 2019-09-11 | End: 2019-10-11

## 2019-09-11 ASSESSMENT — GAIT ASSESSMENTS
DISTANCE (FEET): 100
ASSISTIVE DEVICE: FRONT WHEEL WALKER
DEVIATION: ANTALGIC
GAIT LEVEL OF ASSIST: SUPERVISED

## 2019-09-11 ASSESSMENT — COGNITIVE AND FUNCTIONAL STATUS - GENERAL
MOVING TO AND FROM BED TO CHAIR: A LOT
MOBILITY SCORE: 14
SUGGESTED CMS G CODE MODIFIER MOBILITY: CL
TURNING FROM BACK TO SIDE WHILE IN FLAT BAD: A LOT
WALKING IN HOSPITAL ROOM: A LITTLE
CLIMB 3 TO 5 STEPS WITH RAILING: A LOT
HELP NEEDED FOR BATHING: A LITTLE
DAILY ACTIVITIY SCORE: 22
STANDING UP FROM CHAIR USING ARMS: A LITTLE
SUGGESTED CMS G CODE MODIFIER DAILY ACTIVITY: CJ
DRESSING REGULAR LOWER BODY CLOTHING: A LITTLE
MOVING FROM LYING ON BACK TO SITTING ON SIDE OF FLAT BED: A LOT

## 2019-09-11 NOTE — DISCHARGE INSTRUCTIONS
Discharge Instructions    Discharged to home by car with relative. Discharged via wheelchair, hospital escort: Yes.  Special equipment needed: Walker    Be sure to schedule a follow-up appointment with your primary care doctor or any specialists as instructed.     Discharge Plan:   Diet Plan: Discussed  Activity Level: Discussed  Confirmed Follow up Appointment: Patient to Call and Schedule Appointment  Confirmed Symptoms Management: Discussed  Medication Reconciliation Updated: Yes  Influenza Vaccine Indication: Indicated: Not available from distributor/    I understand that a diet low in cholesterol, fat, and sodium is recommended for good health. Unless I have been given specific instructions below for another diet, I accept this instruction as my diet prescription.   Other diet: regular    Special Instructions: Discharge instructions for the Orthopedic Patient    Follow up with Primary Care Physician within 2 weeks of discharge to home, regarding:  Review of medications and diagnostic testing.  Surveillance for medical complications.  Workup and treatment of osteoporosis, if appropriate.     -Is this a Joint Replacement patient? Yes   Total Joint Hip Replacement Discharge Instructions    Pain  - The goal is to slowly wean off the prescription pain medicine.  - Ice can be used for pain control.  20 minutes at a time is recommended, and never directly against your skin or incision.  - Most patients are off the pain pills by 3 weeks; others may require a low level of pain medications for many months. If your pain continues to be severe, follow up with your physician.  Infection  Deep hip joint infections that require removal of the prostheses occur in less than 0.1% of patients. Lesser infections in the skin (cellulites) are more common and much more easily treated.  - Keep the incision as clean and dry as possible.  - Always wash your hands before touching your incision.  - Skin infections tend to  develop around 7-10 days after surgery, most can be treated with oral antibiotics.  - Dental Care should be delayed for 3 months after surgery, your surgeon recommends taking a dose of antibiotics 1 hour prior to any dental procedure.  After 2 years, most surgeons recommend antibiotics only before an extensive procedure.  Ask your surgeon what he recommends.  - Signs and symptoms of infection can include:  low grade fever, redness, pain, swelling and drainage from your incision.  Notify your surgeon immediately if you develop any of these symptoms.  Post op Disturbances  - Bowel habits - constipation is extremely common and is caused by a combination of anesthesia, lack of mobility and pain medicine.  Use stool softeners or laxatives if necessary. It is important not to ignore this problem, as bowel obstructions can be a serious complication after joint replacement surgery.  - Mood/Energy Level - Many patients experience a lack of energy and endurance for up to 2-3 months after surgery.  Some may also feel down and can even become depressed.  This is likely due to the postoperative anemia, change in activity level, lack of sleep, pain medicine and just the emotional reaction to the surgery itself that is a big disruption in a person’s life.  This usually passes.  If symptoms persist, follow up with your primary physician.  - Returning to work - Your surgeon will give you more specific instructions.  Generally, if you work a sedentary job requiring little standing or walking, most patients may return within 2-6 weeks.  Manual labor jobs involving walking, lifting and standing may take 3-4 months.  Your surgeon’s office can provide a release to part-time or light duty work early on in your recovery and progress you to full duty as able.  - Driving - You can begin driving an automatic shift car in 4 to 8 weeks, provided you are no longer taking narcotic pain medication. If you have a stick-shift car and your right hip  was replaced, do not begin driving until your doctor says you can.   - Avoiding falls -  throw rugs and tack down loose carpeting.  Be aware of floor hazards such as pets, small objects or uneven surfaces.   -  Airport Metal Detectors - The sensitivity of metal detectors varies and it is likely that your prosthesis will cause an alarm. Inform the  that you have an artificial joint.  Diet  - Resume your normal diet as tolerated.  - It is important to achieve a healthy nutritional status by eating a well balanced diet on a regular basis.  - Your physician may recommend that you take iron and vitamin supplements.   - Continue to drink plenty of fluids.  Shower/Bathing  - You may shower starting postop day 3, 9/12/19.  - Keep your incision out of water.  To keep the incision dry when showering, cover it with a plastic bag or plastic wrap.  - Pat incision dry if it gets wet.  Don’t rub.  - Do not submerge in a bath until staples are out and the incision is completely healed. (Approximately 6-8 weeks after surgery).  Dressing Change:  Procedure (if recommended by your physician)  - Wash hands.  - Open all dressing change materials.  - Remove old dressing and discard.  - Inspect incision for redness, increase in clear drainage, yellow/green drainage, odor and surrounding skin hot to touch.  -  ABD (large gauze) pad by one corner and lay over the incision.  Be careful not to touch the inside of the dressing that will lay over the incision.  - Secure in place as instructed (Ace wrap or tape).    Swelling/Bruising  - Swelling is normal after hip replacement and can involve the thigh, knee, calf and foot.  - Swelling can last from 3-6 months.  - Elevate your leg higher than your heart while reclining.  The first week you are home you should elevate your leg an equal amount of time, as you are active.    - Anti-inflammatory pills can be taken once you have stopped the blood thinners.  - The  swelling is usually worse after you go home since you are upright for longer periods of time.  - Bruising is common and can involve the entire leg including the thigh, calf and even foot.  Bruising often does not appear until after you arrive home and it can be quite dramatic- purple, black, green.  The bruising you can see is not usually concerning and will subside without any treatment.      Blood Clot Prevention  Blood clots in the legs and the less common, but frightening, clots that travel to the lungs are a real focus of our preventative. Most patients are at standard risk for them, but those patients who are at higher risk include people who have had previous clots, a family history of clotting, smoking, diabetes, obesity, advanced age, use of estrogen and a sedentary lifestyle.    - Signs of blood clots in legs - Swelling in thigh, calf or ankle that does not go down with elevation.  Pain, heat and tenderness in calf, back of calf or groin area.  NOTE: blood clots can occur in either leg.  - You have been receiving anticoagulant therapy (blood thinners) in the hospital and you may be instructed to continue at home depending on your risk factors.  - Your risk for developing a clot continues for up to 2-3 months after surgery.  You should avoid prolonged sitting and dehydration during that time (long air trips and car trips).  If you do take a trip during this time, please get up and move around every 1- 1.5 hours.  - If you are prescribed blood thinning medication for home, follow instructions as directed. (Handouts provided if applicable).      Activity    Once you get home, you should stay active. The key is not to overdo it! While you can expect some good days and some bad days, you should notice a gradual improvement over time you should notice a gradual improvement and a gradual increase in your endurance over the next 6 to 12 months.    - Weight Bearing - If you have undergone cemented or hybrid hip  replacement, you can put some weight on the leg immediately using a cane or walker, and you should continue to use some support for 4 to 6 weeks to help the muscles recover.   - Sleeping Positions - Sleep on your back with your legs slightly apart or on your side with a regular pillow between your knees. Be sure to use the pillow for at least 6 weeks, or until your doctor says you can do without it. Sleeping on your stomach should be all right  - Sitting - For at least the first 3 months, sit only in chairs that have arms. Do not sit on low chairs, low stools, or reclining chairs. Do not cross your legs at the knees. The physical therapist will show you how to sit and stand from a chair, keeping your affected leg out in front of you. Get up and move around on a regular basis--at least once every hour.  - Walking - Walk as much as you like once your doctor gives you the go-ahead, but remember that walking is no substitute for your prescribed exercises. Walking with a pair of trekking poles is helpful and adds as much as 40% to the exercise you get when you walk  - Therapy may be needed in some cases, to strengthen your muscles and improve your gait (walking pattern).  This decision will be made at your post-operative appointment.  Follow your therapist recommended post-operative exercises (handout provided by Therapist).  - Swimming is also recommended; you can begin as soon as the sutures have been removed and the wound is healed, approximately 6 to 8 weeks after surgery. Using a pair of training fins may make swimming a more enjoyable and effective exercise.  - Other activities - Lower impact activities are preferred.  If you have specific questions, consult your Surgeon.    - Sexual activity - Your surgeon can tell you when it should be safe to resume sexual activity.      When to Call the Doctor   Call the physician if:   - Fever over 100.5? F  - Increased pain, drainage, redness, odor or heat around the incision  area  - Shaking chills  - Increased knee pain with activity and rest  - Increased pain in calf, tenderness or redness above or below the knee  - Increased swelling of calf, ankle, foot  - Sudden increased shortness of breath, sudden onset of chest pain, localized chest pain with coughing  - Incision opening  Or, if there are any questions or concerns about medications or care.       -Is this patient being discharged with medication to prevent blood clots?    Yes, Aspirin Aspirin, ASA oral tablets  What is this medicine?  ASPIRIN (AS pir in) is a pain reliever. It is used to treat mild pain and fever. This medicine is also used as directed by a doctor to prevent and to treat heart attacks, to prevent strokes, and to treat arthritis or inflammation.  This medicine may be used for other purposes; ask your health care provider or pharmacist if you have questions.  COMMON BRAND NAME(S): Aspir-Low, Aspir-Thi, Aspirtab, French Advanced Aspirin, French Aspirin, French Aspirin Extra Strength, French Aspirin Plus, French Extra Strength, French Extra Strength Plus, French Genuine Aspirin, French Womens Aspirin, Bufferin, Bufferin Extra Strength, Bufferin Low Dose  What should I tell my health care provider before I take this medicine?  They need to know if you have any of these conditions:  -anemia  -asthma  -bleeding problems  -child with chickenpox, the flu, or other viral infection  -diabetes  -gout  -if you frequently drink alcohol containing drinks  -kidney disease  -liver disease  -low level of vitamin K  -lupus  -smoke tobacco  -stomach ulcers or other problems  -an unusual or allergic reaction to aspirin, tartrazine dye, other medicines, dyes, or preservatives  -pregnant or trying to get pregnant  -breast-feeding  How should I use this medicine?  Take this medicine by mouth with a glass of water. Follow the directions on the package or prescription label. You can take this medicine with or without food. If it upsets your  stomach, take it with food. Do not take your medicine more often than directed.  Talk to your pediatrician regarding the use of this medicine in children. While this drug may be prescribed for children as young as 12 years of age for selected conditions, precautions do apply. Children and teenagers should not use this medicine to treat chicken pox or flu symptoms unless directed by a doctor.  Patients over 65 years old may have a stronger reaction and need a smaller dose.  Overdosage: If you think you have taken too much of this medicine contact a poison control center or emergency room at once.  NOTE: This medicine is only for you. Do not share this medicine with others.  What if I miss a dose?  If you are taking this medicine on a regular schedule and miss a dose, take it as soon as you can. If it is almost time for your next dose, take only that dose. Do not take double or extra doses.  What may interact with this medicine?  Do not take this medicine with any of the following medications:  -cidofovir  -ketorolac  -probenecid  This medicine may also interact with the following medications:  -alcohol  -alendronate  -bismuth subsalicylate  -flavocoxid  -herbal supplements like feverfew, garlic, lev, ginkgo biloba, horse chestnut  -medicines for diabetes or glaucoma like acetazolamide, methazolamide  -medicines for gout  -medicines that treat or prevent blood clots like enoxaparin, heparin, ticlopidine, warfarin  -other aspirin and aspirin-like medicines  -NSAIDs, medicines for pain and inflammation, like ibuprofen or naproxen  -pemetrexed  -sulfinpyrazone  -varicella live vaccine  This list may not describe all possible interactions. Give your health care provider a list of all the medicines, herbs, non-prescription drugs, or dietary supplements you use. Also tell them if you smoke, drink alcohol, or use illegal drugs. Some items may interact with your medicine.  What should I watch for while using this  medicine?  If you are treating yourself for pain, tell your doctor or health care professional if the pain lasts more than 10 days, if it gets worse, or if there is a new or different kind of pain. Tell your doctor if you see redness or swelling. Also, check with your doctor if you have a fever that lasts for more than 3 days. Only take this medicine to prevent heart attacks or blood clotting if prescribed by your doctor or health care professional.  Do not take aspirin or aspirin-like medicines with this medicine. Too much aspirin can be dangerous. Always read the labels carefully.  This medicine can irritate your stomach or cause bleeding problems. Do not smoke cigarettes or drink alcohol while taking this medicine. Do not lie down for 30 minutes after taking this medicine to prevent irritation to your throat.  If you are scheduled for any medical or dental procedure, tell your healthcare provider that you are taking this medicine. You may need to stop taking this medicine before the procedure.  This medicine may be used to treat migraines. If you take migraine medicines for 10 or more days a month, your migraines may get worse. Keep a diary of headache days and medicine use. Contact your healthcare professional if your migraine attacks occur more frequently.  What side effects may I notice from receiving this medicine?  Side effects that you should report to your doctor or health care professional as soon as possible:  -allergic reactions like skin rash, itching or hives, swelling of the face, lips, or tongue  -breathing problems  -changes in hearing, ringing in the ears  -confusion  -general ill feeling or flu-like symptoms  -pain on swallowing  -redness, blistering, peeling or loosening of the skin, including inside the mouth or nose  -signs and symptoms of bleeding such as bloody or black, tarry stools; red or dark-brown urine; spitting up blood or brown material that looks like coffee grounds; red spots on  the skin; unusual bruising or bleeding from the eye, gums, or nose  -trouble passing urine or change in the amount of urine  -unusually weak or tired  -yellowing of the eyes or skin  Side effects that usually do not require medical attention (report to your doctor or health care professional if they continue or are bothersome):  -diarrhea or constipation  -headache  -nausea, vomiting  -stomach gas, heartburn  This list may not describe all possible side effects. Call your doctor for medical advice about side effects. You may report side effects to FDA at 5-634-PVA-0773.  Where should I keep my medicine?  Keep out of the reach of children.  Store at room temperature between 15 and 30 degrees C (59 and 86 degrees F). Protect from heat and moisture. Do not use this medicine if it has a strong vinegar smell. Throw away any unused medicine after the expiration date.  NOTE: This sheet is a summary. It may not cover all possible information. If you have questions about this medicine, talk to your doctor, pharmacist, or health care provider.  © 2018 [employer]vier/Gold Standard (2014-08-19 11:30:31)      Depression / Suicide Risk    As you are discharged from this [facility] facility, it is important to learn how to keep safe from harming yourself.    Recognize the warning signs:  · Abrupt changes in personality, positive or negative- including increase in energy   · Giving away possessions  · Change in eating patterns- significant weight changes-  positive or negative  · Change in sleeping patterns- unable to sleep or sleeping all the time   · Unwillingness or inability to communicate  · Depression  · Unusual sadness, discouragement and loneliness  · Talk of wanting to die  · Neglect of personal appearance   · Rebelliousness- reckless behavior  · Withdrawal from people/activities they love  · Confusion- inability to concentrate     If you or a loved one observes any of these behaviors or has concerns about self-harm, here's  what you can do:  · Talk about it- your feelings and reasons for harming yourself  · Remove any means that you might use to hurt yourself (examples: pills, rope, extension cords, firearm)  · Get professional help from the community (Mental Health, Substance Abuse, psychological counseling)  · Do not be alone:Call your Safe Contact- someone whom you trust who will be there for you.  · Call your local CRISIS HOTLINE 672-2404 or 887-002-9538  · Call your local Children's Mobile Crisis Response Team [place] (758) 357-1899 or www.[place]  · Call the toll free National Suicide Prevention Hotlines   · National Suicide Prevention Lifeline 827-908-AHGF (0731)  · National Hope Line Network 800-SUICIDE (279-3713)

## 2019-09-11 NOTE — CARE PLAN
Problem: Communication  Goal: The ability to communicate needs accurately and effectively will improve  Outcome: PROGRESSING AS EXPECTED  Intervention: Educate patient and significant other/support system about the plan of care, procedures, treatments, medications and allow for questions  Note:   Patient and wife up to date on plan of care, verbalize understanding to medications to be administered this shift. Answered all questions accordingly. Verbalize understanding.      Problem: Pain Management  Goal: Pain level will decrease to patient's comfort goal  Outcome: PROGRESSING AS EXPECTED  Intervention: Educate and implement non-pharmacologic comfort measures. Examples: relaxation, distration, play therapy, activity therapy, massage, etc.  Note:   Patient verbalizes to be at a comfortable level of pain. Reported wishes to receive Tylenol to MD, received orders for a PRN. Patient verbalizes understanding to frequency and availability of medication

## 2019-09-11 NOTE — THERAPY
"Occupational Therapy Treatment completed with focus on ADLs, ADL transfers and patient education.  Functional Status: Pt seen for OT tx. Supervision supine >sit, spvr sit >stand, spvr of ambulation to the BR w/fww. Pt education of sock aid, reacher and the importance of maintaining hip percautions. Education of adaptive showering techniques at home to maintain hip precautions. Pt continues to be motivated to regain strength, endurance, and independence in ADLs and ADL txfs.    Plan of Care: Will benefit from Occupational Therapy 4 times per week  Discharge Recommendations:  Equipment Will Continue to Assess for Equipment Needs. Post-acute therapy Discharge to home with outpatient or home health for additional skilled therapy services.    See \"Rehab Therapy-Acute\" Patient Summary Report for complete documentation.   "

## 2019-09-11 NOTE — DISCHARGE PLANNING
Anticipated Disposition:  Home with Home Health Care      Action:   Met with Pt in his room, wife present at the bedside. Home [place] choices offered to Pt, He stated I need to look into it. This CM offered to come back to obtain choices. Pt lives with spouse in a single story house. Pt uses crutches to get around.     Assessment competed.        Barriers to Discharge:   Home Health acceptance.       Plan:  DC home with Home health.   Obtain Home health choice from Pt.       Addendum:  Pt selected [facility].   Faxed to [facility].   Pending acceptance     Care Transition Team Assessment    Information Source  Orientation : Oriented x 4  Information Given By: Patient  Informant's Name: Pablo  Who is responsible for making decisions for patient? : Patient    Readmission Evaluation  Is this a readmission?: No    Elopement Risk  Legal Hold: No  Ambulatory or Self Mobile in Wheelchair: Yes  Disoriented: No  Psychiatric Symptoms: None  History of Wandering: No  Elopement this Admit: No  Vocalizing Wanting to Leave: No  Displays Behaviors, Body Language Wanting to Leave: No-Not at Risk for Elopement  Elopement Risk: Not at Risk for Elopement    Interdisciplinary Discharge Planning  Does Admitting Nurse Feel This Could be a Complex Discharge?: No  Primary Care Physician: Dr.Katie Allen  Lives with - Patient's Self Care Capacity: Spouse  Patient or legal guardian wants to designate a caregiver (see row info): No  Support Systems: Spouse / Significant Other, Family Member(s)  Housing / Facility: 1 Story Apartment / Condo  Do You Take your Prescribed Medications Regularly: Yes  Able to Return to Previous ADL's: Future Time w/Therapy  Mobility Issues: Yes  Prior Services: None  Patient Expects to be Discharged to:: Home  Assistance Needed: No  Durable Medical Equipment: Other - Specify(cruches)    Discharge Preparedness  What is your plan after discharge?: Home health care  What are your discharge supports?: Spouse  Prior  Functional Level: Ambulatory, Drives Self, Independent with Activities of Daily Living    Functional Assesment  Prior Functional Level: Ambulatory, Drives Self, Independent with Activities of Daily Living    Finances  Financial Barriers to Discharge: No  Prescription Coverage: Yes    Vision / Hearing Impairment  Vision Impairment : No  Hearing Impairment : No              Domestic Abuse  Have you ever been the victim of abuse or violence?: No  Physical Abuse or Sexual Abuse: No  Verbal Abuse or Emotional Abuse: No  Possible Abuse Reported to:: Not Applicable         Discharge Risks or Barriers  Discharge risks or barriers?: No    Anticipated Discharge Information  Anticipated discharge disposition: Home  Discharge Address: [address] [place] [address] [place] [id number]

## 2019-09-11 NOTE — DISCHARGE PLANNING
Agency/Facility Name: [facility]  Spoke To: Opal  Outcome: Patient accepted pending insurance auth.

## 2019-09-11 NOTE — DISCHARGE PLANNING
[facility]ency[facility]Facility Name: [facility]  Spoke To: Opal  Outcome: Patient declined, patient has a medi-share plan, which is not an insurance plan. Medishare plan does not contract with [facility].

## 2019-09-11 NOTE — DISCHARGE PLANNING
Received Choice form at 1105  Agency/Facility Name: [facility]  Referral sent per Choice form at 1110

## 2019-09-11 NOTE — DISCHARGE PLANNING
Agency/Facility Name: [facility]  Spoke To: Estefania  Outcome: Referral pending.

## 2019-09-11 NOTE — THERAPY
"Physical Therapy Treatment completed.   Bed Mobility:  Supine to Sit: Supervised(HOB flat and no railing)  Transfers: Sit to Stand: Supervised  Gait: Level Of Assist: Supervised with Front-Wheel Walker       Plan of Care: Will benefit from Physical Therapy 4 times per week  Discharge Recommendations: Equipment:FWW delivered to room.   Post-acute therapy Discharge to home with outpatient or home health for additional skilled therapy services.   Pt is cleared for d/c home from PT standpoint. PT will be available for any further d/c needs or ?'s.     See \"Rehab Therapy-Acute\" Patient Summary Report for complete documentation.       "

## 2019-09-11 NOTE — DISCHARGE PLANNING
Received Choice form at 0955  Agency/Facility Name: [facility]   Referral sent per Choice form at 0904

## 2019-09-12 NOTE — DISCHARGE SUMMARY
Hospital Medicine Discharge Note     Admit Date:  9/8/2019       Discharge Date:   9/11/2019    Attending Physician:  Octavio Garcia M.D.      Diagnoses (includes active and resolved):     Principal Problem:    Closed fracture of left hip (HCC) POA: Unknown  Active Problems:    Macrocytosis POA: Unknown    Hyperglycemia POA: Unknown    BPH (benign prostatic hyperplasia) POA: Unknown    Thrombocytopenia (HCC) POA: Yes    Anemia POA: Unknown    Hospital Summary (Brief Narrative):         58-year-old male history of BPH admitted after he tripped and fell while hiking admitted with findings of acute left hip fracture.  Patient consulted by orthopedics.  He underwent left total hip arthroplasty.  Patient hemoglobin did decline to 10 and stabilized in 9 range.  Blood pressure heart rate was stable.  Patient with findings of thrombocytopenia with platelet count ranging 111 to 86 K.  She had no reported history of alcohol use or liver disease.  He stated he had previously low platelets and anemia before.  I discussed findings with patient and it was recommended he have outpatient follow-up with primary care provider for CBC follow-up in and possible hematology referral.  Following therapy his pain was fairly well controlled.  He was up with use of walker.  DME walker was arranged as well as home health that was pending at time of discharge.  On day of discharge I examined patient, discussed findings plan of care and follow-up.  His left hip revealed no signs for hematoma, localized warmth redness.  There is mild postop swelling.  Patient advised to follow-up outpatient with his orthopedist Dr. Boyce.  Return to ER for increased pain, swelling fever weakness not feeling well.     Consultants:        Orthopedics Dr. Boyce    Imaging/ Testing:      DX-PELVIS-1 OR 2 VIEWS   Final Result      Recent placement of LEFT hip prosthesis, without associated fracture.      DX-CHEST-LIMITED (1 VIEW)   Final Result      No evidence of  acute cardiopulmonary process.      DX-FEMUR-2+ LEFT   Final Result      Impacted and angulated left femoral neck fracture.            Procedures:          DATE OF SERVICE:  09/09/2019     PREOPERATIVE DIAGNOSES:    1.  Left displaced femoral neck fracture.  2.  Left hip osteoarthritis.     POSTOPERATIVE DIAGNOSES:    1.  Left displaced femoral neck fracture.  2.  Left hip osteoarthritis.     PROCEDURE PERFORMED:  Open treatment of left femoral neck fracture with total   hip arthroplasty.     SURGEON:  Yung Boyce MD     ANESTHESIOLOGIST:  Jose J Sanchez MD    Discharge Medications:             Medication List      START taking these medications      Instructions   aspirin 325 MG Tabs  Commonly known as:  ASA   Take 1 Tab by mouth every 12 hours for 30 days.  Dose:  325 mg        CONTINUE taking these medications      Instructions   doxazosin 1 MG Tabs  Commonly known as:  CARDURA   Take 1 mg by mouth every bedtime.  Dose:  1 mg               Diet:       DIET ORDERS (From admission to next 24h)    None            Activity:   As tolerated.      Code status:   Full code    Primary Care Provider:    Hillary Allen M.D.    Follow up appointment details :      .  Yung Boyce M.D.  [address]  [address]  [facility] [id number] [phone]      call ASAP to schedule fu appt for 2 weeks postop    .        Time spent on discharge day patient visit: 38  minutes    #################################################

## 2019-09-20 LAB — EKG IMPRESSION: NORMAL

## 2025-05-20 NOTE — CARE PLAN
Problem: Communication  Goal: The ability to communicate needs accurately and effectively will improve  Outcome: PROGRESSING AS EXPECTED     Problem: Safety  Goal: Will remain free from injury  Outcome: PROGRESSING AS EXPECTED  Goal: Will remain free from falls  Outcome: PROGRESSING AS EXPECTED      Occupational Therapy    Visit Type: treatment  SUBJECTIVE  \" Am I going to go home when I sit up\"   Patient / Family Goal: return to previous functional status    Pain   Patient denies pain., Patient does not demonstrate pain behaviors.    OBJECTIVE     Cognitive Status   Level of Consciousness   - alert  Arousal Alertness   - appropriate responses to stimuli  Affect/Behavior    - confused, cooperative and impulsive  Orientation    - Oriented to: person  Functional Communication   - Forms of Communication: verbal  Attention Span    - Attention: impaired   - Attention impairment: internal factors  Following Direction   - follows one step commands inconsistently (with tactile cues)  Transition Between Tasks   - transitions with cues  Executive Function    - Sequencing: impaired   - Problem Solving: impaired   - Initiation: impaired   - Execution: impaired  Memory   - impaired  Safety Awareness/Insight   - decreased awareness of need for assistance and decreased awareness of need for safety    Patient Activity Tolerance: 1 to 2 activity to rest       Sitting Balance  ([place] = base of support)  Static      - Trial 1 details: independent  Dynamic      - Trial 1 details: contact guard    Standing Balance  ([place] = base of support)  Firm Surface: Double Leg      - Static, Eyes Open       - Trial 1 details: contact guard     - Dynamic, Eyes Open       - Trial 1 details: minimal assist       Bed Mobility  - Rolling left: independent  - Rolling right: independent  - Supine to sit: with tactile cues, contact guard/touching/steadying assist  - Sit to supine: contact guard/touching/steadying assist, with tactile cues  Transfers  Assistive devices: 2-wheeled walker  - Sit to stand: with tactile cues, contact guard/touching/steadying assist  - Stand to sit: with tactile cues, contact guard/touching/steadying assist      Activities of Daily Living (ADLs)  Grooming/Oral Hygiene:   - Grooming assist: moderate assist       - Oral hygiene  assist: minimal assist  Upper Body Dressing:  - Assist: minimal assist  Lower Body Dressing:   - Assist: maximal assist  - Footwear:       - Assistance: maximal assist  Bathing:   - Assist: maximal assist   Interventions    Skilled input: verbal instruction/cues and tactile instruction/cues  Verbal Consent: Writer verbally educated and received verbal consent for hand placement, positioning of patient, and techniques to be performed today from patient for clothing adjustments for techniques and therapist position for techniques as described above and how they are pertinent to the patient's plan of care.         Education:   Education provided during session:  - cognitive strategies, ADL strategies and role of OT  - Results of above outlined education: Needs reinforcement    ASSESSMENT   Progress: progressing toward goals  Interfering components: cognitive deficits and decreased activity tolerance    Discharge needs based on today's assessment:  - Current level of function: significantly below baseline level of function  - Therapy needs at discharge: therapy 1-3 times per week  - Activities of daily living (ADLs) requiring support at discharge: bed mobility, transfers, dressing, bathing and toileting  - Instrumental activities of daily living (IADLs) requiring support at discharge: health/medication management, meal preparation, home management and shopping  - Impairments that require further therapy intervention: strength, activity tolerance, balance and cognition  Patient tolerated Occupational therapy session fairly. Patient with increased participation and followed one step commands with extended time. Patient worked on UE dressing and functional sit to stands. Patient continues to benefit from skilled therapy.       AM-PAC  - Prior Level of Function: Needs a little help ([facility] 12-21)       Key: MOD A=moderate assistance, IND/MOD I=independent/modified independent  - Generalized Current Level of Function     -  Current Self-Cares: 13       Scoring Key= >21 Modified Independent; 20-21 Supervision; 18-19 Minimal assist; 13-17 Moderate assist; 9-12 Max assist; <9 Total assist      PLAN (while hospitalized)  Suggestions for next session as indicated:     OT Frequency: 3-5 x per week      PT/OT Mobility Equipment for Discharge: rolling walker         Documented in the chart in the following areas: Assessment/Plan.    Patient at End of Session:   Location: in bed  Safety measures: alarm system in place/re-engaged, equipment intact, call light within reach, bed rails x3 and lines intact  Handoff to: nurse      Therapy procedure time and total treatment time can be found documented on the Time Entry flowsheet

## (undated) DEVICE — GLOVE SZ 7.5 BIOGEL PI MICRO - PF LF (50PR/BX)

## (undated) DEVICE — SODIUM CHL. IRRIGATION 0.9% 3000ML (4EA/CA 65CA/PF)

## (undated) DEVICE — DRAPE SURG STERI-DRAPE 7X11OD - (40EA/CA)

## (undated) DEVICE — CHLORAPREP 26 ML APPLICATOR - ORANGE TINT(25/CA)

## (undated) DEVICE — ELECTRODE 850 FOAM ADHESIVE - HYDROGEL RADIOTRNSPRNT (50/PK)

## (undated) DEVICE — DRAPESURG STERI-DRAPE LONG - (10/BX 4BX/CA)

## (undated) DEVICE — COVER TABLE 44 X 90 - (22/CA)

## (undated) DEVICE — CANISTER SUCTION 3000ML MECHANICAL FILTER AUTO SHUTOFF MEDI-VAC NONSTERILE LF DISP  (40EA/CA)

## (undated) DEVICE — NEPTUNE 4 PORT MANIFOLD - (20/PK)

## (undated) DEVICE — GOWN SURGEONS X-LARGE - DISP. (30/CA)

## (undated) DEVICE — SENSOR SPO2 NEO LNCS ADHESIVE (20/BX) SEE USER NOTES

## (undated) DEVICE — GOWN WARMING STANDARD FLEX - (30/CA)

## (undated) DEVICE — MASK ANESTHESIA ADULT  - (100/CA)

## (undated) DEVICE — SUTURE 1 VICRYL PLUS CTX - 36 INCH (36/BX)

## (undated) DEVICE — SUTURE 5 TI-CRON HOS-14 - (36/BX)

## (undated) DEVICE — DRAPE IOBAN II 23 IN X 33 IN - (10/BX)

## (undated) DEVICE — PAD LAP STERILE 18 X 18 - (5/PK 40PK/CA)

## (undated) DEVICE — SYRINGE ASEPTO - (50EA/CA

## (undated) DEVICE — GLOVE BIOGEL PI INDICATOR SZ 7.5 SURGICAL PF LF -(50/BX 4BX/CA)

## (undated) DEVICE — PROTECTOR ULNA NERVE - (36PR/CA)

## (undated) DEVICE — LENS/HOOD FOR SPACESUIT - (32/PK) PEEL AWAY FACE

## (undated) DEVICE — BLADE SAGITTAL SAW DUAL CUT 25.0 X 90.0 X 1.27MM (1/EA)

## (undated) DEVICE — HOOD, SURGICAL

## (undated) DEVICE — SET EXTENSION WITH 2 PORTS (48EA/CA) ***PART #2C8610 IS A SUBSTITUTE*****

## (undated) DEVICE — GLOVE BIOGEL INDICATOR SZ 6 SURGICAL PF LTX -(50/BX)

## (undated) DEVICE — SODIUM CHL IRRIGATION 0.9% 1000ML (12EA/CA)

## (undated) DEVICE — Device

## (undated) DEVICE — GLOVE BIOGEL SZ 6.5 SURGICAL PF LTX (50PR/BX 4BX/CA)

## (undated) DEVICE — STAPLER SKIN DISP - (6/BX 10BX/CA) VISISTAT

## (undated) DEVICE — STOCKINET TUBULAR 4IN STERILE - 4 X 36YDS (25/CA)

## (undated) DEVICE — SET LEADWIRE 5 LEAD BEDSIDE DISPOSABLE ECG (1SET OF 5/EA)

## (undated) DEVICE — GLOVE BIOGEL PI ORTHO SZ 7 PF LF (40PR/BX)

## (undated) DEVICE — BLADE SURGICAL CLIPPER - (50EA/CA)

## (undated) DEVICE — GLOVE BIOGEL PI INDICATOR SZ 8.0 SURGICAL PF LF -(50/BX 4BX/CA)

## (undated) DEVICE — MIXER BONE CEMENT REVOLUTION - W/FEMORAL PRESSURIZER (6/CA)

## (undated) DEVICE — SUTURE 0 VICRYL PLUS CTX - 36 INCH (36/BX)

## (undated) DEVICE — GLOVE BIOGEL INDICATOR SZ 7.5 SURGICAL PF LTX - (50PR/BX 4BX/CA)

## (undated) DEVICE — GLOVE BIOGEL SZ 7.5 SURGICAL PF LTX - (50PR/BX 4BX/CA)

## (undated) DEVICE — HANDPIECE 10FT INTPLS SCT PLS IRRIGATION HAND CONTROL SET (6/PK)

## (undated) DEVICE — SUTURE GENERAL

## (undated) DEVICE — SUTURE 2-0 VICRYL PLUS CTX - 36 INCH (36/BX)

## (undated) DEVICE — DRAPE LARGE 3 QUARTER - (20/CA)

## (undated) DEVICE — KIT ANESTHESIA W/CIRCUIT & 3/LT BAG W/FILTER (20EA/CA)

## (undated) DEVICE — GLOVE SZ 7 BIOGEL PI MICRO - PF LF (50PR/BX 4BX/CA)

## (undated) DEVICE — SLEEVE, VASO, THIGH, MED

## (undated) DEVICE — ELECTRODE DUAL RETURN W/ CORD - (50/PK)

## (undated) DEVICE — PACK TOTAL HIP - (1/CA)

## (undated) DEVICE — DRESSING POST OP BORDER 4 X 10 (5EA/BX)

## (undated) DEVICE — SUCTION INSTRUMENT YANKAUER BULBOUS TIP W/O VENT (50EA/CA)

## (undated) DEVICE — TUBING CLEARLINK DUO-VENT - C-FLO (48EA/CA)

## (undated) DEVICE — LACTATED RINGERS INJ 1000 ML - (14EA/CA 60CA/PF)

## (undated) DEVICE — HEAD HOLDER JUNIOR/ADULT

## (undated) DEVICE — TIP INTPLS HFLO ML ORFC BTRY - (12/CS)  FOR SURGILAV

## (undated) DEVICE — GLOVE BIOGEL INDICATOR SZ 6.5 SURGICAL PF LTX - (50PR/BX 4BX/CA)